# Patient Record
Sex: MALE | Race: WHITE | HISPANIC OR LATINO | URBAN - METROPOLITAN AREA
[De-identification: names, ages, dates, MRNs, and addresses within clinical notes are randomized per-mention and may not be internally consistent; named-entity substitution may affect disease eponyms.]

---

## 2024-04-30 ENCOUNTER — INPATIENT (INPATIENT)
Facility: HOSPITAL | Age: 59
LOS: 5 days | Discharge: HOME HEALTH | DRG: 639 | End: 2024-05-06
Admitting: STUDENT IN AN ORGANIZED HEALTH CARE EDUCATION/TRAINING PROGRAM
Payer: MEDICAID

## 2024-04-30 VITALS
HEIGHT: 74 IN | WEIGHT: 162.92 LBS | RESPIRATION RATE: 20 BRPM | TEMPERATURE: 98 F | SYSTOLIC BLOOD PRESSURE: 184 MMHG | DIASTOLIC BLOOD PRESSURE: 93 MMHG | HEART RATE: 92 BPM | OXYGEN SATURATION: 98 %

## 2024-04-30 DIAGNOSIS — E11.65 TYPE 2 DIABETES MELLITUS WITH HYPERGLYCEMIA: ICD-10-CM

## 2024-04-30 LAB
ALBUMIN SERPL ELPH-MCNC: 3.6 G/DL — SIGNIFICANT CHANGE UP (ref 3.3–5.2)
ALP SERPL-CCNC: 90 U/L — SIGNIFICANT CHANGE UP (ref 40–120)
ALT FLD-CCNC: 10 U/L — SIGNIFICANT CHANGE UP
ANION GAP SERPL CALC-SCNC: 15 MMOL/L — SIGNIFICANT CHANGE UP (ref 5–17)
AST SERPL-CCNC: 14 U/L — SIGNIFICANT CHANGE UP
BASOPHILS # BLD AUTO: 0.03 K/UL — SIGNIFICANT CHANGE UP (ref 0–0.2)
BASOPHILS NFR BLD AUTO: 0.7 % — SIGNIFICANT CHANGE UP (ref 0–2)
BILIRUB SERPL-MCNC: 0.3 MG/DL — LOW (ref 0.4–2)
BUN SERPL-MCNC: 23.7 MG/DL — HIGH (ref 8–20)
CALCIUM SERPL-MCNC: 9.3 MG/DL — SIGNIFICANT CHANGE UP (ref 8.4–10.5)
CHLORIDE SERPL-SCNC: 88 MMOL/L — LOW (ref 96–108)
CO2 SERPL-SCNC: 25 MMOL/L — SIGNIFICANT CHANGE UP (ref 22–29)
CREAT SERPL-MCNC: 0.87 MG/DL — SIGNIFICANT CHANGE UP (ref 0.5–1.3)
EGFR: 99 ML/MIN/1.73M2 — SIGNIFICANT CHANGE UP
EOSINOPHIL # BLD AUTO: 0.14 K/UL — SIGNIFICANT CHANGE UP (ref 0–0.5)
EOSINOPHIL NFR BLD AUTO: 3.1 % — SIGNIFICANT CHANGE UP (ref 0–6)
GLUCOSE BLDC GLUCOMTR-MCNC: 181 MG/DL — HIGH (ref 70–99)
GLUCOSE SERPL-MCNC: 554 MG/DL — CRITICAL HIGH (ref 70–99)
HCT VFR BLD CALC: 38.8 % — LOW (ref 39–50)
HGB BLD-MCNC: 13.4 G/DL — SIGNIFICANT CHANGE UP (ref 13–17)
IMM GRANULOCYTES NFR BLD AUTO: 0.2 % — SIGNIFICANT CHANGE UP (ref 0–0.9)
LYMPHOCYTES # BLD AUTO: 1.25 K/UL — SIGNIFICANT CHANGE UP (ref 1–3.3)
LYMPHOCYTES # BLD AUTO: 27.5 % — SIGNIFICANT CHANGE UP (ref 13–44)
MCHC RBC-ENTMCNC: 30.1 PG — SIGNIFICANT CHANGE UP (ref 27–34)
MCHC RBC-ENTMCNC: 34.5 GM/DL — SIGNIFICANT CHANGE UP (ref 32–36)
MCV RBC AUTO: 87.2 FL — SIGNIFICANT CHANGE UP (ref 80–100)
MONOCYTES # BLD AUTO: 0.68 K/UL — SIGNIFICANT CHANGE UP (ref 0–0.9)
MONOCYTES NFR BLD AUTO: 15 % — HIGH (ref 2–14)
NEUTROPHILS # BLD AUTO: 2.43 K/UL — SIGNIFICANT CHANGE UP (ref 1.8–7.4)
NEUTROPHILS NFR BLD AUTO: 53.5 % — SIGNIFICANT CHANGE UP (ref 43–77)
PLATELET # BLD AUTO: 299 K/UL — SIGNIFICANT CHANGE UP (ref 150–400)
POTASSIUM SERPL-MCNC: 4.8 MMOL/L — SIGNIFICANT CHANGE UP (ref 3.5–5.3)
POTASSIUM SERPL-SCNC: 4.8 MMOL/L — SIGNIFICANT CHANGE UP (ref 3.5–5.3)
PROT SERPL-MCNC: 7.3 G/DL — SIGNIFICANT CHANGE UP (ref 6.6–8.7)
RBC # BLD: 4.45 M/UL — SIGNIFICANT CHANGE UP (ref 4.2–5.8)
RBC # FLD: 11.3 % — SIGNIFICANT CHANGE UP (ref 10.3–14.5)
SODIUM SERPL-SCNC: 128 MMOL/L — LOW (ref 135–145)
WBC # BLD: 4.54 K/UL — SIGNIFICANT CHANGE UP (ref 3.8–10.5)
WBC # FLD AUTO: 4.54 K/UL — SIGNIFICANT CHANGE UP (ref 3.8–10.5)

## 2024-04-30 PROCEDURE — 99222 1ST HOSP IP/OBS MODERATE 55: CPT

## 2024-04-30 PROCEDURE — 99285 EMERGENCY DEPT VISIT HI MDM: CPT

## 2024-04-30 PROCEDURE — 73630 X-RAY EXAM OF FOOT: CPT | Mod: 26,LT

## 2024-04-30 RX ORDER — SODIUM CHLORIDE 9 MG/ML
1000 INJECTION, SOLUTION INTRAVENOUS
Refills: 0 | Status: DISCONTINUED | OUTPATIENT
Start: 2024-04-30 | End: 2024-05-06

## 2024-04-30 RX ORDER — LANOLIN ALCOHOL/MO/W.PET/CERES
3 CREAM (GRAM) TOPICAL AT BEDTIME
Refills: 0 | Status: DISCONTINUED | OUTPATIENT
Start: 2024-04-30 | End: 2024-05-06

## 2024-04-30 RX ORDER — HEPARIN SODIUM 5000 [USP'U]/ML
5000 INJECTION INTRAVENOUS; SUBCUTANEOUS EVERY 12 HOURS
Refills: 0 | Status: DISCONTINUED | OUTPATIENT
Start: 2024-04-30 | End: 2024-05-06

## 2024-04-30 RX ORDER — INSULIN GLARGINE 100 [IU]/ML
20 INJECTION, SOLUTION SUBCUTANEOUS AT BEDTIME
Refills: 0 | Status: DISCONTINUED | OUTPATIENT
Start: 2024-04-30 | End: 2024-05-05

## 2024-04-30 RX ORDER — LOSARTAN POTASSIUM 100 MG/1
100 TABLET, FILM COATED ORAL DAILY
Refills: 0 | Status: DISCONTINUED | OUTPATIENT
Start: 2024-04-30 | End: 2024-05-06

## 2024-04-30 RX ORDER — ACETAMINOPHEN 500 MG
650 TABLET ORAL EVERY 6 HOURS
Refills: 0 | Status: DISCONTINUED | OUTPATIENT
Start: 2024-04-30 | End: 2024-05-06

## 2024-04-30 RX ORDER — DEXTROSE 50 % IN WATER 50 %
15 SYRINGE (ML) INTRAVENOUS ONCE
Refills: 0 | Status: DISCONTINUED | OUTPATIENT
Start: 2024-04-30 | End: 2024-05-06

## 2024-04-30 RX ORDER — INSULIN LISPRO 100/ML
5 VIAL (ML) SUBCUTANEOUS
Refills: 0 | Status: DISCONTINUED | OUTPATIENT
Start: 2024-04-30 | End: 2024-05-05

## 2024-04-30 RX ORDER — PIPERACILLIN AND TAZOBACTAM 4; .5 G/20ML; G/20ML
3.38 INJECTION, POWDER, LYOPHILIZED, FOR SOLUTION INTRAVENOUS EVERY 8 HOURS
Refills: 0 | Status: DISCONTINUED | OUTPATIENT
Start: 2024-05-01 | End: 2024-05-06

## 2024-04-30 RX ORDER — INSULIN HUMAN 100 [IU]/ML
10 INJECTION, SOLUTION SUBCUTANEOUS ONCE
Refills: 0 | Status: COMPLETED | OUTPATIENT
Start: 2024-04-30 | End: 2024-04-30

## 2024-04-30 RX ORDER — PIPERACILLIN AND TAZOBACTAM 4; .5 G/20ML; G/20ML
3.38 INJECTION, POWDER, LYOPHILIZED, FOR SOLUTION INTRAVENOUS ONCE
Refills: 0 | Status: COMPLETED | OUTPATIENT
Start: 2024-05-01 | End: 2024-05-01

## 2024-04-30 RX ORDER — ONDANSETRON 8 MG/1
4 TABLET, FILM COATED ORAL EVERY 8 HOURS
Refills: 0 | Status: DISCONTINUED | OUTPATIENT
Start: 2024-04-30 | End: 2024-05-06

## 2024-04-30 RX ORDER — SODIUM CHLORIDE 9 MG/ML
3 INJECTION INTRAMUSCULAR; INTRAVENOUS; SUBCUTANEOUS EVERY 8 HOURS
Refills: 0 | Status: DISCONTINUED | OUTPATIENT
Start: 2024-04-30 | End: 2024-05-06

## 2024-04-30 RX ORDER — SODIUM CHLORIDE 9 MG/ML
1000 INJECTION INTRAMUSCULAR; INTRAVENOUS; SUBCUTANEOUS ONCE
Refills: 0 | Status: COMPLETED | OUTPATIENT
Start: 2024-04-30 | End: 2024-04-30

## 2024-04-30 RX ORDER — INSULIN LISPRO 100/ML
VIAL (ML) SUBCUTANEOUS
Refills: 0 | Status: DISCONTINUED | OUTPATIENT
Start: 2024-04-30 | End: 2024-05-06

## 2024-04-30 RX ORDER — DEXTROSE 10 % IN WATER 10 %
125 INTRAVENOUS SOLUTION INTRAVENOUS ONCE
Refills: 0 | Status: DISCONTINUED | OUTPATIENT
Start: 2024-04-30 | End: 2024-05-06

## 2024-04-30 RX ORDER — PIPERACILLIN AND TAZOBACTAM 4; .5 G/20ML; G/20ML
3.38 INJECTION, POWDER, LYOPHILIZED, FOR SOLUTION INTRAVENOUS ONCE
Refills: 0 | Status: COMPLETED | OUTPATIENT
Start: 2024-04-30 | End: 2024-04-30

## 2024-04-30 RX ORDER — GLUCAGON INJECTION, SOLUTION 0.5 MG/.1ML
1 INJECTION, SOLUTION SUBCUTANEOUS ONCE
Refills: 0 | Status: DISCONTINUED | OUTPATIENT
Start: 2024-04-30 | End: 2024-05-06

## 2024-04-30 RX ORDER — DEXTROSE 50 % IN WATER 50 %
25 SYRINGE (ML) INTRAVENOUS ONCE
Refills: 0 | Status: DISCONTINUED | OUTPATIENT
Start: 2024-04-30 | End: 2024-05-06

## 2024-04-30 RX ADMIN — Medication 100 MILLIGRAM(S): at 19:56

## 2024-04-30 RX ADMIN — SODIUM CHLORIDE 1000 MILLILITER(S): 9 INJECTION INTRAMUSCULAR; INTRAVENOUS; SUBCUTANEOUS at 21:05

## 2024-04-30 RX ADMIN — INSULIN GLARGINE 20 UNIT(S): 100 INJECTION, SOLUTION SUBCUTANEOUS at 23:16

## 2024-04-30 RX ADMIN — INSULIN HUMAN 10 UNIT(S): 100 INJECTION, SOLUTION SUBCUTANEOUS at 19:56

## 2024-04-30 RX ADMIN — Medication 2: at 23:15

## 2024-04-30 RX ADMIN — SODIUM CHLORIDE 3 MILLILITER(S): 9 INJECTION INTRAMUSCULAR; INTRAVENOUS; SUBCUTANEOUS at 23:00

## 2024-04-30 RX ADMIN — SODIUM CHLORIDE 1000 MILLILITER(S): 9 INJECTION INTRAMUSCULAR; INTRAVENOUS; SUBCUTANEOUS at 19:56

## 2024-04-30 NOTE — ED PROVIDER NOTE - OBJECTIVE STATEMENT
58 year old male with PMHx NIDDM, HTN presenting for evaluation of wound to dorsal aspect of L foot. Patient states on 4/23/24 he had just returned home from gardening, took off his sock, which pulled off a layer of his skin with it. States that in the morning when putting on his sock, there was no blister or injury to the foot. States that as he took off the sock, thinks there was a blister that was broken open and unroofed. States that the skin at that time was pink. Has been applying neosporin and crushed aspirin to it. Reports increased surrounding redness and blisters. Denies any pain, fever, chills, numbness, tingling. 58 year old male with PMHx NIDDM (metformin 1g BID), HTN presenting for evaluation of wound to dorsal aspect of L foot. Patient states on 4/23/24 he had just returned home from gardening, took off his sock, which pulled off a layer of his skin with it. States that in the morning when putting on his sock, there was no blister or injury to the foot. States that as he took off the sock, thinks there was a blister that was broken open and unroofed. States that the skin at that time was pink. Has been applying neosporin and crushed aspirin to it. Reports increased surrounding redness and blisters. Denies any pain, fever, chills, numbness, tingling.

## 2024-04-30 NOTE — ED ADULT NURSE NOTE - OBJECTIVE STATEMENT
patient came in for eval of left foot wound that started a week ago after removing his socks and the top skin of his left foot came with it. wound is non draining with dry yellow discharge surrounding it.

## 2024-04-30 NOTE — ED PROVIDER NOTE - CLINICAL SUMMARY MEDICAL DECISION MAKING FREE TEXT BOX
58 year old male presenting with L foot wound. 58 year old male presenting with L foot wound x 1 week, poorly healing. Labs reviewed, patient with uncontrolled DM, a1C 14.1. Patient treated with clindamycin, insulin, fluids. Plan for admission for abx, podiatry eval, diabetes management. 58 year old male presenting with L foot wound x 1 week, poorly healing. Labs reviewed, patient with uncontrolled DM, a1C 14.1. Patient treated with clindamycin, insulin, fluids. Consult placed to podiatry. Plan for admission for abx, podiatry eval, diabetes management.

## 2024-04-30 NOTE — ED PROVIDER NOTE - CARE PLAN
1 Principal Discharge DX:	Uncontrolled diabetes mellitus with hyperglycemia  Secondary Diagnosis:	Wound of foot

## 2024-04-30 NOTE — ED PROVIDER NOTE - ATTENDING CONTRIBUTION TO CARE
I, Lopez Plaza, performed a face to face bedside interview with this patient regarding history of present illness, and completed an independent physical examination. I personally made/approved the management plan and take responsibility for the patient management. I have communicated the patient’s plan of care and disposition with the resident.  58-year-old male with past medical history of non-insulin-dependent diabetes presents with foot ulcer and swelling.  Patient reports that 1 week ago he started develop a ulcer to the dorsum of his left foot.  He states that over the interim week he has had progressive redness, swelling, pain to the foot.  No numbness, tingling, fevers  Gen: NAD, well appearing  CV: RRR  Pul: CTA b/l  Abd: Soft, non-distended, non-tender  Neuro: no focal deficits  msk:  large superficial ulcer to the dorsum of the left foot, surrounding erythema, induration, tenderness and edema extending all the way to the ankle  Pt  admitted for infected diabetic foot ulcer and uncontrolled diabetes

## 2024-04-30 NOTE — ED PROVIDER NOTE - PRINCIPAL DIAGNOSIS
Patient daughter has been informed that we are working on getting Saturday hours . Patient daughter stated that they would prefer Saturday appts.     Patient daughter has been advised that we will keep her posted .  
Please let patient's daughter know that we are still working on getting me Saturday hours. If she can be seen during the week, then we should set something up. I have another meeting on 9/14 about the Saturday clinic, so it is unlikely that I can see her before then.    Thanks,  THOR  
Uncontrolled diabetes mellitus with hyperglycemia

## 2024-04-30 NOTE — ED ADULT TRIAGE NOTE - CHIEF COMPLAINT QUOTE
Pt arrives to ED c/o L foot wound - pt states a week ago he took his sock off and the skin came off + redness noted. Hx of DM type II

## 2024-04-30 NOTE — ED PROVIDER NOTE - PHYSICAL EXAMINATION
Gen: well appearing, no acute distress  Head: normocephalic, atraumatic  EENT: EOMI, moist mucous membranes  Lung: no increased work of breathing, clear to auscultation bilaterally, no wheezing, rales, rhonchi, speaking in full sentences  CV: regular rate, regular rhythm  Abd: soft, non-tender, non-distended  MSK: (+)b/l LE edema mostly concentrated at ankles. (+)large open wound to dorsal aspect of L foot with surrounding edema, erythema, bullae   Neuro: Awake, alert, no focal neurologic deficits  Psych: normal affect, normal speech

## 2024-04-30 NOTE — ED ADULT NURSE NOTE - PRO INTERPRETER NEED 2
----- Message from LAURIE Newell sent at 3/5/2021  3:34 PM CST -----  Note BMP - sodium remains low, new orders written at facility this morning.   Prydeinig

## 2024-05-01 LAB
ANION GAP SERPL CALC-SCNC: 10 MMOL/L — SIGNIFICANT CHANGE UP (ref 5–17)
BUN SERPL-MCNC: 25 MG/DL — HIGH (ref 8–20)
CALCIUM SERPL-MCNC: 9 MG/DL — SIGNIFICANT CHANGE UP (ref 8.4–10.5)
CHLORIDE SERPL-SCNC: 94 MMOL/L — LOW (ref 96–108)
CO2 SERPL-SCNC: 29 MMOL/L — SIGNIFICANT CHANGE UP (ref 22–29)
CREAT SERPL-MCNC: 0.69 MG/DL — SIGNIFICANT CHANGE UP (ref 0.5–1.3)
CRP SERPL-MCNC: 27 MG/L — HIGH
EGFR: 106 ML/MIN/1.73M2 — SIGNIFICANT CHANGE UP
ERYTHROCYTE [SEDIMENTATION RATE] IN BLOOD: 56 MM/HR — HIGH (ref 0–15)
GLUCOSE BLDC GLUCOMTR-MCNC: 152 MG/DL — HIGH (ref 70–99)
GLUCOSE BLDC GLUCOMTR-MCNC: 170 MG/DL — HIGH (ref 70–99)
GLUCOSE BLDC GLUCOMTR-MCNC: 203 MG/DL — HIGH (ref 70–99)
GLUCOSE BLDC GLUCOMTR-MCNC: 323 MG/DL — HIGH (ref 70–99)
GLUCOSE SERPL-MCNC: 208 MG/DL — HIGH (ref 70–99)
HCT VFR BLD CALC: 35.8 % — LOW (ref 39–50)
HGB BLD-MCNC: 12.2 G/DL — LOW (ref 13–17)
MAGNESIUM SERPL-MCNC: 2.2 MG/DL — SIGNIFICANT CHANGE UP (ref 1.6–2.6)
MCHC RBC-ENTMCNC: 30 PG — SIGNIFICANT CHANGE UP (ref 27–34)
MCHC RBC-ENTMCNC: 34.1 GM/DL — SIGNIFICANT CHANGE UP (ref 32–36)
MCV RBC AUTO: 88.2 FL — SIGNIFICANT CHANGE UP (ref 80–100)
PHOSPHATE SERPL-MCNC: 3.2 MG/DL — SIGNIFICANT CHANGE UP (ref 2.4–4.7)
PLATELET # BLD AUTO: 283 K/UL — SIGNIFICANT CHANGE UP (ref 150–400)
POTASSIUM SERPL-MCNC: 3.7 MMOL/L — SIGNIFICANT CHANGE UP (ref 3.5–5.3)
POTASSIUM SERPL-SCNC: 3.7 MMOL/L — SIGNIFICANT CHANGE UP (ref 3.5–5.3)
RBC # BLD: 4.06 M/UL — LOW (ref 4.2–5.8)
RBC # FLD: 11.3 % — SIGNIFICANT CHANGE UP (ref 10.3–14.5)
SODIUM SERPL-SCNC: 133 MMOL/L — LOW (ref 135–145)
WBC # BLD: 5.23 K/UL — SIGNIFICANT CHANGE UP (ref 3.8–10.5)
WBC # FLD AUTO: 5.23 K/UL — SIGNIFICANT CHANGE UP (ref 3.8–10.5)

## 2024-05-01 PROCEDURE — 99222 1ST HOSP IP/OBS MODERATE 55: CPT

## 2024-05-01 PROCEDURE — 99233 SBSQ HOSP IP/OBS HIGH 50: CPT

## 2024-05-01 RX ORDER — LOSARTAN POTASSIUM 100 MG/1
1 TABLET, FILM COATED ORAL
Refills: 0 | DISCHARGE

## 2024-05-01 RX ORDER — INFLUENZA VIRUS VACCINE 15; 15; 15; 15 UG/.5ML; UG/.5ML; UG/.5ML; UG/.5ML
0.5 SUSPENSION INTRAMUSCULAR ONCE
Refills: 0 | Status: DISCONTINUED | OUTPATIENT
Start: 2024-05-01 | End: 2024-05-06

## 2024-05-01 RX ADMIN — HEPARIN SODIUM 5000 UNIT(S): 5000 INJECTION INTRAVENOUS; SUBCUTANEOUS at 05:02

## 2024-05-01 RX ADMIN — SODIUM CHLORIDE 3 MILLILITER(S): 9 INJECTION INTRAMUSCULAR; INTRAVENOUS; SUBCUTANEOUS at 13:49

## 2024-05-01 RX ADMIN — Medication 2: at 18:05

## 2024-05-01 RX ADMIN — PIPERACILLIN AND TAZOBACTAM 200 GRAM(S): 4; .5 INJECTION, POWDER, LYOPHILIZED, FOR SOLUTION INTRAVENOUS at 00:13

## 2024-05-01 RX ADMIN — Medication 8: at 13:42

## 2024-05-01 RX ADMIN — Medication 5 UNIT(S): at 18:04

## 2024-05-01 RX ADMIN — INSULIN GLARGINE 20 UNIT(S): 100 INJECTION, SOLUTION SUBCUTANEOUS at 21:51

## 2024-05-01 RX ADMIN — SODIUM CHLORIDE 3 MILLILITER(S): 9 INJECTION INTRAMUSCULAR; INTRAVENOUS; SUBCUTANEOUS at 05:16

## 2024-05-01 RX ADMIN — PIPERACILLIN AND TAZOBACTAM 25 GRAM(S): 4; .5 INJECTION, POWDER, LYOPHILIZED, FOR SOLUTION INTRAVENOUS at 18:04

## 2024-05-01 RX ADMIN — HEPARIN SODIUM 5000 UNIT(S): 5000 INJECTION INTRAVENOUS; SUBCUTANEOUS at 18:06

## 2024-05-01 RX ADMIN — PIPERACILLIN AND TAZOBACTAM 25 GRAM(S): 4; .5 INJECTION, POWDER, LYOPHILIZED, FOR SOLUTION INTRAVENOUS at 09:30

## 2024-05-01 RX ADMIN — Medication 5 UNIT(S): at 13:41

## 2024-05-01 RX ADMIN — Medication 2: at 21:52

## 2024-05-01 RX ADMIN — PIPERACILLIN AND TAZOBACTAM 25 GRAM(S): 4; .5 INJECTION, POWDER, LYOPHILIZED, FOR SOLUTION INTRAVENOUS at 02:14

## 2024-05-01 RX ADMIN — SODIUM CHLORIDE 3 MILLILITER(S): 9 INJECTION INTRAMUSCULAR; INTRAVENOUS; SUBCUTANEOUS at 21:48

## 2024-05-01 RX ADMIN — Medication 5 UNIT(S): at 09:30

## 2024-05-01 RX ADMIN — LOSARTAN POTASSIUM 100 MILLIGRAM(S): 100 TABLET, FILM COATED ORAL at 05:02

## 2024-05-01 RX ADMIN — PIPERACILLIN AND TAZOBACTAM 25 GRAM(S): 4; .5 INJECTION, POWDER, LYOPHILIZED, FOR SOLUTION INTRAVENOUS at 21:51

## 2024-05-01 RX ADMIN — Medication 4: at 09:30

## 2024-05-01 NOTE — CONSULT NOTE ADULT - SUBJECTIVE AND OBJECTIVE BOX
Vascular Attending:        HPI:  57 y/o male with hx of HTN/DM-2 presents to ED c/o 1 week of left foot swelling, redness, and large wound to top of foot. He states  he was working outside last week and later in the evening when he took his shoe and sock off the skin on top of his foot sloughed off. Since that time he was putting Neosporin and he was rubbing aspirin in the wound. Today when he woke up his foot was swollen, red, and there was draining from the wound. His family advised him to come to the ED for further evaluation. Denies fever, chills, N/V, SOB, or CP. In the ED vitals stable, left foot with large 8x6cm area of denuded skin with fibrinous exudates purulence with surrounding erythema, edema, and warmth. No crepitus, no fluctuance noted. Xray with no soft tissue air. Labs with glucose of 554, A1c of 14. normal AG/HCO3, Cultures sent, started on Clindamycin. Denies any other complaints at this time.   (01 May 2024 00:07)      PAST MEDICAL & SURGICAL HISTORY:  HTN (hypertension)      Diabetes      No significant past surgical history          REVIEW OF SYSTEMS      General:	    Skin/Breast:  	  Ophthalmologic:  	  ENMT:	    Respiratory and Thorax:  	  Cardiovascular:	    Gastrointestinal:	    Genitourinary:	    Musculoskeletal:	    Neurological:	    Psychiatric:	    Hematology/Lymphatics:	    Endocrine:	    Allergic/Immunologic:	    MEDICATIONS  (STANDING):  dextrose 10% Bolus 125 milliLiter(s) IV Bolus once  dextrose 5%. 1000 milliLiter(s) (50 mL/Hr) IV Continuous <Continuous>  dextrose 50% Injectable 25 Gram(s) IV Push once  glucagon  Injectable 1 milliGRAM(s) IntraMuscular once  heparin   Injectable 5000 Unit(s) SubCutaneous every 12 hours  influenza   Vaccine 0.5 milliLiter(s) IntraMuscular once  insulin glargine Injectable (LANTUS) 20 Unit(s) SubCutaneous at bedtime  insulin lispro (ADMELOG) corrective regimen sliding scale   SubCutaneous Before meals and at bedtime  insulin lispro Injectable (ADMELOG) 5 Unit(s) SubCutaneous before lunch  insulin lispro Injectable (ADMELOG) 5 Unit(s) SubCutaneous before dinner  insulin lispro Injectable (ADMELOG) 5 Unit(s) SubCutaneous before breakfast  losartan 100 milliGRAM(s) Oral daily  piperacillin/tazobactam IVPB.- 3.375 Gram(s) IV Intermittent once  piperacillin/tazobactam IVPB.. 3.375 Gram(s) IV Intermittent every 8 hours  sodium chloride 0.9% lock flush 3 milliLiter(s) IV Push every 8 hours    MEDICATIONS  (PRN):  acetaminophen     Tablet .. 650 milliGRAM(s) Oral every 6 hours PRN Temp greater or equal to 38C (100.4F), Mild Pain (1 - 3)  aluminum hydroxide/magnesium hydroxide/simethicone Suspension 30 milliLiter(s) Oral every 4 hours PRN Dyspepsia  dextrose Oral Gel 15 Gram(s) Oral once PRN Blood Glucose LESS THAN 70 milliGRAM(s)/deciliter  melatonin 3 milliGRAM(s) Oral at bedtime PRN Insomnia  ondansetron Injectable 4 milliGRAM(s) IV Push every 8 hours PRN Nausea and/or Vomiting      Allergies    No Known Allergies    Intolerances        SOCIAL HISTORY:      Vital Signs Last 24 Hrs  T(C): 36.7 (01 May 2024 10:47), Max: 36.9 (01 May 2024 04:40)  T(F): 98.1 (01 May 2024 10:47), Max: 98.4 (01 May 2024 04:40)  HR: 80 (01 May 2024 10:47) (75 - 92)  BP: 117/70 (01 May 2024 10:47) (117/70 - 184/93)  BP(mean): 104 (01 May 2024 00:07) (104 - 104)  RR: 18 (01 May 2024 04:40) (18 - 20)  SpO2: 96% (01 May 2024 10:47) (96% - 98%)    Parameters below as of 01 May 2024 04:40  Patient On (Oxygen Delivery Method): room air        PHYSICAL EXAM:      Constitutional:    Eyes:    ENMT:    Neck:    Breasts:    Back:    Respiratory:    Cardiovascular:    Gastrointestinal:    Genitourinary:    Rectal:    Extremities:    Vascular:    Neurological:    Skin:    Lymph Nodes:    Musculoskeletal:    Psychiatric:      Pulses:   Right:                                                                          Left:  FEM [ ]2+ [ ]1+ [ ]doppler                                             FEM [ ]2+ [ ]1+ [ ]doppler    POP [ ]2+ [ ]1+ [ ]doppler                                             POP [ ]2+ [ ]1+ [ ]doppler    DP [ ]2+ [ ]1+ [ ]doppler                                                DP [ ]2+ [ ]1+ [ ]doppler  PT[ ]2+ [ ]1+ [ ]doppler                                                  PT [ ]2+ [ ]1+ [ ]doppler      LABS:                        12.2   5.23  )-----------( 283      ( 01 May 2024 03:41 )             35.8     05-01    133<L>  |  94<L>  |  25.0<H>  ----------------------------<  208<H>  3.7   |  29.0  |  0.69    Ca    9.0      01 May 2024 03:41  Phos  3.2     05-01  Mg     2.2     05-01    TPro  7.3  /  Alb  3.6  /  TBili  0.3<L>  /  DBili  x   /  AST  14  /  ALT  10  /  AlkPhos  90  04-30      Urinalysis Basic - ( 01 May 2024 03:41 )    Color: x / Appearance: x / SG: x / pH: x  Gluc: 208 mg/dL / Ketone: x  / Bili: x / Urobili: x   Blood: x / Protein: x / Nitrite: x   Leuk Esterase: x / RBC: x / WBC x   Sq Epi: x / Non Sq Epi: x / Bacteria: x        RADIOLOGY & ADDITIONAL STUDIES    Impression and Plan: Vascular Attending:  Silvia VAN    Vascular Surgery HPI  Admission HPI reviewed below  59 yo hist of DM, presenting with foot wear trauma, non healing left foot wound  left dorsum wound with associated edema and erythema  vascular surgical input requested  no history of claudication, rest pain, or vascular interventions       HPI:  59 y/o male with hx of HTN/DM-2 presents to ED c/o 1 week of left foot swelling, redness, and large wound to top of foot. He states  he was working outside last week and later in the evening when he took his shoe and sock off the skin on top of his foot sloughed off. Since that time he was putting Neosporin and he was rubbing aspirin in the wound. Today when he woke up his foot was swollen, red, and there was draining from the wound. His family advised him to come to the ED for further evaluation. Denies fever, chills, N/V, SOB, or CP. In the ED vitals stable, left foot with large 8x6cm area of denuded skin with fibrinous exudates purulence with surrounding erythema, edema, and warmth. No crepitus, no fluctuance noted. Xray with no soft tissue air. Labs with glucose of 554, A1c of 14. normal AG/HCO3, Cultures sent, started on Clindamycin. Denies any other complaints at this time.   (01 May 2024 00:07)      PAST MEDICAL & SURGICAL HISTORY:  HTN (hypertension)      Diabetes      No significant past surgical history          REVIEW OF SYSTEMS;  see HPI      General:	    Skin/Breast:  	  Ophthalmologic:  	  ENMT:	    Respiratory and Thorax:  	  Cardiovascular:	    Gastrointestinal:	    Genitourinary:	    Musculoskeletal:	    Neurological:	    Psychiatric:	    Hematology/Lymphatics:	    Endocrine:	    Allergic/Immunologic:	    MEDICATIONS  (STANDING):  dextrose 10% Bolus 125 milliLiter(s) IV Bolus once  dextrose 5%. 1000 milliLiter(s) (50 mL/Hr) IV Continuous <Continuous>  dextrose 50% Injectable 25 Gram(s) IV Push once  glucagon  Injectable 1 milliGRAM(s) IntraMuscular once  heparin   Injectable 5000 Unit(s) SubCutaneous every 12 hours  influenza   Vaccine 0.5 milliLiter(s) IntraMuscular once  insulin glargine Injectable (LANTUS) 20 Unit(s) SubCutaneous at bedtime  insulin lispro (ADMELOG) corrective regimen sliding scale   SubCutaneous Before meals and at bedtime  insulin lispro Injectable (ADMELOG) 5 Unit(s) SubCutaneous before lunch  insulin lispro Injectable (ADMELOG) 5 Unit(s) SubCutaneous before dinner  insulin lispro Injectable (ADMELOG) 5 Unit(s) SubCutaneous before breakfast  losartan 100 milliGRAM(s) Oral daily  piperacillin/tazobactam IVPB.- 3.375 Gram(s) IV Intermittent once  piperacillin/tazobactam IVPB.. 3.375 Gram(s) IV Intermittent every 8 hours  sodium chloride 0.9% lock flush 3 milliLiter(s) IV Push every 8 hours    MEDICATIONS  (PRN):  acetaminophen     Tablet .. 650 milliGRAM(s) Oral every 6 hours PRN Temp greater or equal to 38C (100.4F), Mild Pain (1 - 3)  aluminum hydroxide/magnesium hydroxide/simethicone Suspension 30 milliLiter(s) Oral every 4 hours PRN Dyspepsia  dextrose Oral Gel 15 Gram(s) Oral once PRN Blood Glucose LESS THAN 70 milliGRAM(s)/deciliter  melatonin 3 milliGRAM(s) Oral at bedtime PRN Insomnia  ondansetron Injectable 4 milliGRAM(s) IV Push every 8 hours PRN Nausea and/or Vomiting      Allergies    No Known Allergies    Intolerances        SOCIAL HISTORY: non contributory       Vital Signs Last 24 Hrs  T(C): 36.7 (01 May 2024 10:47), Max: 36.9 (01 May 2024 04:40)  T(F): 98.1 (01 May 2024 10:47), Max: 98.4 (01 May 2024 04:40)  HR: 80 (01 May 2024 10:47) (75 - 92)  BP: 117/70 (01 May 2024 10:47) (117/70 - 184/93)  BP(mean): 104 (01 May 2024 00:07) (104 - 104)  RR: 18 (01 May 2024 04:40) (18 - 20)  SpO2: 96% (01 May 2024 10:47) (96% - 98%)    Parameters below as of 01 May 2024 04:40  Patient On (Oxygen Delivery Method): room air        PHYSICAL EXAM:      Constitutional:  no distress, alert and oriented     Eyes: no scleral icterus     ENMT: atraumatic     Neck: supple, no access catheters       Respiratory: non labored    Cardiovascular: rrr via peripheral palpation     Gastrointestinal: soft, nontender    Genitourinary:  not examined     Rectal: not examined     Extremities: significant edema, left foot with moderate size dorsal wound, fibrinous  with surrounding erythema     Vascular: non palpable left pedals, secondary to edema    Neurological: reduced motor fxn to the left foot due to edema    Skin: warm         Psychiatric: good affect       Pulses:   Right:                                                                          Left:  FEM [ ]2+ [ ]1+ [ ]doppler                                             FEM [ ]2+ [ ]1+ [ ]doppler    POP [ ]2+ [ ]1+ [ ]doppler                                             POP [ x2+ [ ]1+ [ ]doppler    DP [ x]2+ [ ]1+ [ ]doppler                                                DP [ ]2+ [ ]1+ [ x]doppler  P[x ]2+ [ ]1+ [ ]doppler                                                  PT [ ]2+ [ ]1+ [x ]doppler      LABS:                        12.2   5.23  )-----------( 283      ( 01 May 2024 03:41 )             35.8     05-01    133<L>  |  94<L>  |  25.0<H>  ----------------------------<  208<H>  3.7   |  29.0  |  0.69    Ca    9.0      01 May 2024 03:41  Phos  3.2     05-01  Mg     2.2     05-01    TPro  7.3  /  Alb  3.6  /  TBili  0.3<L>  /  DBili  x   /  AST  14  /  ALT  10  /  AlkPhos  90  04-30      Urinalysis Basic - ( 01 May 2024 03:41 )    Color: x / Appearance: x / SG: x / pH: x  Gluc: 208 mg/dL / Ketone: x  / Bili: x / Urobili: x   Blood: x / Protein: x / Nitrite: x   Leuk Esterase: x / RBC: x / WBC x   Sq Epi: x / Non Sq Epi: x / Bacteria: x        RADIOLOGY & ADDITIONAL STUDIES      Impression and Plan:    left foot traumatic non healing wound with associated  Vascular Attending:  Silvia VAN    Vascular Surgery HPI  Admission HPI reviewed below  59 yo hist of DM, presenting with foot wear trauma, non healing left foot wound  left dorsum wound with associated edema and erythema  vascular surgical input requested  no history of claudication, rest pain, or vascular interventions       HPI:  57 y/o male with hx of HTN/DM-2 presents to ED c/o 1 week of left foot swelling, redness, and large wound to top of foot. He states  he was working outside last week and later in the evening when he took his shoe and sock off the skin on top of his foot sloughed off. Since that time he was putting Neosporin and he was rubbing aspirin in the wound. Today when he woke up his foot was swollen, red, and there was draining from the wound. His family advised him to come to the ED for further evaluation. Denies fever, chills, N/V, SOB, or CP. In the ED vitals stable, left foot with large 8x6cm area of denuded skin with fibrinous exudates purulence with surrounding erythema, edema, and warmth. No crepitus, no fluctuance noted. Xray with no soft tissue air. Labs with glucose of 554, A1c of 14. normal AG/HCO3, Cultures sent, started on Clindamycin. Denies any other complaints at this time.   (01 May 2024 00:07)      PAST MEDICAL & SURGICAL HISTORY:  HTN (hypertension)      Diabetes      No significant past surgical history          REVIEW OF SYSTEMS;  see HPI      General:	    Skin/Breast:  	  Ophthalmologic:  	  ENMT:	    Respiratory and Thorax:  	  Cardiovascular:	    Gastrointestinal:	    Genitourinary:	    Musculoskeletal:	    Neurological:	    Psychiatric:	    Hematology/Lymphatics:	    Endocrine:	    Allergic/Immunologic:	    MEDICATIONS  (STANDING):  dextrose 10% Bolus 125 milliLiter(s) IV Bolus once  dextrose 5%. 1000 milliLiter(s) (50 mL/Hr) IV Continuous <Continuous>  dextrose 50% Injectable 25 Gram(s) IV Push once  glucagon  Injectable 1 milliGRAM(s) IntraMuscular once  heparin   Injectable 5000 Unit(s) SubCutaneous every 12 hours  influenza   Vaccine 0.5 milliLiter(s) IntraMuscular once  insulin glargine Injectable (LANTUS) 20 Unit(s) SubCutaneous at bedtime  insulin lispro (ADMELOG) corrective regimen sliding scale   SubCutaneous Before meals and at bedtime  insulin lispro Injectable (ADMELOG) 5 Unit(s) SubCutaneous before lunch  insulin lispro Injectable (ADMELOG) 5 Unit(s) SubCutaneous before dinner  insulin lispro Injectable (ADMELOG) 5 Unit(s) SubCutaneous before breakfast  losartan 100 milliGRAM(s) Oral daily  piperacillin/tazobactam IVPB.- 3.375 Gram(s) IV Intermittent once  piperacillin/tazobactam IVPB.. 3.375 Gram(s) IV Intermittent every 8 hours  sodium chloride 0.9% lock flush 3 milliLiter(s) IV Push every 8 hours    MEDICATIONS  (PRN):  acetaminophen     Tablet .. 650 milliGRAM(s) Oral every 6 hours PRN Temp greater or equal to 38C (100.4F), Mild Pain (1 - 3)  aluminum hydroxide/magnesium hydroxide/simethicone Suspension 30 milliLiter(s) Oral every 4 hours PRN Dyspepsia  dextrose Oral Gel 15 Gram(s) Oral once PRN Blood Glucose LESS THAN 70 milliGRAM(s)/deciliter  melatonin 3 milliGRAM(s) Oral at bedtime PRN Insomnia  ondansetron Injectable 4 milliGRAM(s) IV Push every 8 hours PRN Nausea and/or Vomiting      Allergies    No Known Allergies    Intolerances        SOCIAL HISTORY: non contributory       Vital Signs Last 24 Hrs  T(C): 36.7 (01 May 2024 10:47), Max: 36.9 (01 May 2024 04:40)  T(F): 98.1 (01 May 2024 10:47), Max: 98.4 (01 May 2024 04:40)  HR: 80 (01 May 2024 10:47) (75 - 92)  BP: 117/70 (01 May 2024 10:47) (117/70 - 184/93)  BP(mean): 104 (01 May 2024 00:07) (104 - 104)  RR: 18 (01 May 2024 04:40) (18 - 20)  SpO2: 96% (01 May 2024 10:47) (96% - 98%)    Parameters below as of 01 May 2024 04:40  Patient On (Oxygen Delivery Method): room air        PHYSICAL EXAM:      Constitutional:  no distress, alert and oriented     Eyes: no scleral icterus     ENMT: atraumatic     Neck: supple, no access catheters       Respiratory: non labored    Cardiovascular: rrr via peripheral palpation     Gastrointestinal: soft, nontender    Genitourinary:  not examined     Rectal: not examined     Extremities: significant edema, left foot with moderate size dorsal wound, fibrinous  with surrounding erythema     Vascular: non palpable left pedals, secondary to edema    Neurological: reduced motor fxn to the left foot due to edema    Skin: warm         Psychiatric: good affect       Pulses:   Right:                                                                          Left:  FEM [ ]2+ [ ]1+ [ ]doppler                                             FEM [ ]2+ [ ]1+ [ ]doppler    POP [ ]2+ [ ]1+ [ ]doppler                                             POP [ x2+ [ ]1+ [ ]doppler    DP [ x]2+ [ ]1+ [ ]doppler                                                DP [ ]2+ [ ]1+ [ x]doppler  P[x ]2+ [ ]1+ [ ]doppler                                                  PT [ ]2+ [ ]1+ [x ]doppler      LABS:                        12.2   5.23  )-----------( 283      ( 01 May 2024 03:41 )             35.8     05-01    133<L>  |  94<L>  |  25.0<H>  ----------------------------<  208<H>  3.7   |  29.0  |  0.69    Ca    9.0      01 May 2024 03:41  Phos  3.2     05-01  Mg     2.2     05-01    TPro  7.3  /  Alb  3.6  /  TBili  0.3<L>  /  DBili  x   /  AST  14  /  ALT  10  /  AlkPhos  90  04-30      Urinalysis Basic - ( 01 May 2024 03:41 )    Color: x / Appearance: x / SG: x / pH: x  Gluc: 208 mg/dL / Ketone: x  / Bili: x / Urobili: x   Blood: x / Protein: x / Nitrite: x   Leuk Esterase: x / RBC: x / WBC x   Sq Epi: x / Non Sq Epi: x / Bacteria: x        RADIOLOGY & ADDITIONAL STUDIES      Impression and Plan:    left foot traumatic non healing wound with associated cellulitis  DM    -- will benefit from an investigative angiogram at some point during this admission  - no current contraindications towards podiatry proceeding with any planned intervention

## 2024-05-01 NOTE — H&P ADULT - HISTORY OF PRESENT ILLNESS
57 y/o male with hx of HTN/DM-2 presents to ED c/o 1 week of left foot swelling, redness, and large wound to top of foot. He states  he was working outside last week and later in the evening when he took his shoe and sock off the skin on top of his foot sloughed off. Since that time he was putting Neosporin and he was rubbing aspirin in the wound. Today when he woke up his foot was swollen, red, and there was draining from the wound. His family advised him to come to the ED for further evaluation. Denies fever, chills, N/V, SOB, or CP. In the ED vitals stable, left foot with large 8x6cm area of denuded skin with fibrinous exudates purulence with surrounding erythema, edema, and warmth. No crepitus, no fluctuance noted. Xray with no soft tissue air. Labs with glucose of 554, A1c of 14. normal AG/HCO3, Cultures sent, started on Clindamycin. Denies any other complaints at this time.

## 2024-05-01 NOTE — PROGRESS NOTE ADULT - SUBJECTIVE AND OBJECTIVE BOX
Hospitalist Progress Note    Chief Complaint:  L Foot Cellulitis    SUBJECTIVE / OVERNIGHT EVENTS:  No events overnight, patient seen at bedside, in NAD, no complaints today. Patient denies chest pain, SOB, abd pain, N/V, fever, chills, dysuria or any other complaints. All remainder ROS negative.     MEDICATIONS  (STANDING):  dextrose 10% Bolus 125 milliLiter(s) IV Bolus once  dextrose 5%. 1000 milliLiter(s) (50 mL/Hr) IV Continuous <Continuous>  dextrose 50% Injectable 25 Gram(s) IV Push once  glucagon  Injectable 1 milliGRAM(s) IntraMuscular once  heparin   Injectable 5000 Unit(s) SubCutaneous every 12 hours  influenza   Vaccine 0.5 milliLiter(s) IntraMuscular once  insulin glargine Injectable (LANTUS) 20 Unit(s) SubCutaneous at bedtime  insulin lispro (ADMELOG) corrective regimen sliding scale   SubCutaneous Before meals and at bedtime  insulin lispro Injectable (ADMELOG) 5 Unit(s) SubCutaneous before breakfast  insulin lispro Injectable (ADMELOG) 5 Unit(s) SubCutaneous before lunch  insulin lispro Injectable (ADMELOG) 5 Unit(s) SubCutaneous before dinner  losartan 100 milliGRAM(s) Oral daily  piperacillin/tazobactam IVPB.- 3.375 Gram(s) IV Intermittent once  piperacillin/tazobactam IVPB.. 3.375 Gram(s) IV Intermittent every 8 hours  sodium chloride 0.9% lock flush 3 milliLiter(s) IV Push every 8 hours    MEDICATIONS  (PRN):  acetaminophen     Tablet .. 650 milliGRAM(s) Oral every 6 hours PRN Temp greater or equal to 38C (100.4F), Mild Pain (1 - 3)  aluminum hydroxide/magnesium hydroxide/simethicone Suspension 30 milliLiter(s) Oral every 4 hours PRN Dyspepsia  dextrose Oral Gel 15 Gram(s) Oral once PRN Blood Glucose LESS THAN 70 milliGRAM(s)/deciliter  melatonin 3 milliGRAM(s) Oral at bedtime PRN Insomnia  ondansetron Injectable 4 milliGRAM(s) IV Push every 8 hours PRN Nausea and/or Vomiting        I&O's Summary      PHYSICAL EXAM:  Vital Signs Last 24 Hrs  T(C): 36.9 (01 May 2024 04:40), Max: 36.9 (01 May 2024 04:40)  T(F): 98.4 (01 May 2024 04:40), Max: 98.4 (01 May 2024 04:40)  HR: 75 (01 May 2024 04:40) (75 - 92)  BP: 124/66 (01 May 2024 04:40) (124/66 - 184/93)  BP(mean): 104 (01 May 2024 00:07) (104 - 104)  RR: 18 (01 May 2024 04:40) (18 - 20)  SpO2: 97% (01 May 2024 04:40) (97% - 98%)    Parameters below as of 01 May 2024 04:40  Patient On (Oxygen Delivery Method): room air            CONSTITUTIONAL: NAD,  well-groomed  HEENMT: NC/AT, PERRLA, EOMI, Moist oral mucosa, no pharyngeal injection or exudates; normal dentition  RESPIRATORY: BLAE, No adventitious Lungs Sounds  CARDIOVASCULAR: S1/S2+, RRR, no MRG, No LE Edema  ABDOMEN: Soft, NT/ND, BS+, No guarding  MSK:  Moves limbs with purpose and direction; no clubbing or cyanosis of digits; no joint swelling or tenderness to palpation  PSYCH: normal mood and affect  NEUROLOGY: AAOx4, CN 2-12 are intact and symmetric; no gross sensory deficits;   SKIN: Warm, Dry, LLE Wound dorsum of left foot with large area of denuded skin, exudates/erythema, edema/warmth over    LABS:                        12.2   5.23  )-----------( 283      ( 01 May 2024 03:41 )             35.8     05-01    133<L>  |  94<L>  |  25.0<H>  ----------------------------<  208<H>  3.7   |  29.0  |  0.69    Ca    9.0      01 May 2024 03:41  Phos  3.2     05-01  Mg     2.2     05-01    TPro  7.3  /  Alb  3.6  /  TBili  0.3<L>  /  DBili  x   /  AST  14  /  ALT  10  /  AlkPhos  90  04-30          Urinalysis Basic - ( 01 May 2024 03:41 )    Color: x / Appearance: x / SG: x / pH: x  Gluc: 208 mg/dL / Ketone: x  / Bili: x / Urobili: x   Blood: x / Protein: x / Nitrite: x   Leuk Esterase: x / RBC: x / WBC x   Sq Epi: x / Non Sq Epi: x / Bacteria: x        CAPILLARY BLOOD GLUCOSE      POCT Blood Glucose.: 203 mg/dL (01 May 2024 08:42)  POCT Blood Glucose.: 181 mg/dL (30 Apr 2024 23:02)  POCT Blood Glucose.: 221 mg/dL (30 Apr 2024 21:05)        RADIOLOGY & ADDITIONAL TESTS:  Results Reviewed: Y  Imaging Personally Reviewed: N  Electrocardiogram Personally Reviewed: SUDARSHAN

## 2024-05-01 NOTE — CONSULT NOTE ADULT - ASSESSMENT
A:  Left foot dorsal foot wound      P:  Pt evaluated and chart reviewed  Vitals reviewed, no leukocytosis  Reviewed L foor x-rays- results above   Debrided L foot dorsal wound measurement above) with sterile #15 blade down to and including the subcutaneous level. Left fibrous cap intact. Pt tolerated procedure well.   Applied Santyl, DSD to left foot, patient to keep dressing D/C/I  Patient to be wb as tolerated to b/l LE  Pending JUAN RAMON/PVR- Appreciate vascular consult- pending plan  Podiatry to follow for local wound care and possible surgical intervention including skin graft.   C/w with IV abx per ID/IM  Discussed importance of daily foot examinations and proper shoe gear and to importance of lower Fasting Blood Glucose levels. Strict glycemic control for optimal wound healing.  Podiatry to follow in house  Discussed with Dr. Vega   A:  DFU left foot  Left foot cellulitis  PVD      P:  Pt evaluated and chart reviewed  Vitals reviewed, no leukocytosis  Reviewed L foor x-rays- results above   Debrided L foot dorsal wound measurement above) with sterile #15 blade down to and including the subcutaneous level. Left fibrous cap intact. Pt tolerated procedure well.   Applied Santyl, DSD to left foot, patient to keep dressing D/C/I  Patient to be wb as tolerated to b/l LE  Pending JUAN RAMON/PVR- Appreciate vascular consult- pending plan  Recommend LWC for now  C/w with IV abx per ID/IM  Discussed importance of daily foot examinations and proper shoe gear and to importance of lower Fasting Blood Glucose levels. Strict glycemic control for optimal wound healing.  Podiatry to follow in house  Discussed with Dr. Vega   A:  DFU left foot  Left foot cellulitis  PVD      P:  Pt evaluated and chart reviewed  Vitals reviewed, no leukocytosis  Reviewed L foor x-rays- results above   Selective debridement L foot dorsal wound measurement above) with sterile #15 blade down to and including slough and exudate. Left fibrous cap intact. Pt tolerated procedure well.   Applied Santyl, DSD to left foot, patient to keep dressing D/C/I  Patient to be wb as tolerated to b/l LE  Pending JUAN RAMON/PVR- Appreciate vascular consult- pending plan  Recommend LWC for now  C/w with IV abx per ID/IM  Discussed importance of daily foot examinations and proper shoe gear and to importance of lower Fasting Blood Glucose levels. Strict glycemic control for optimal wound healing.  Podiatry to follow in house  Discussed with Dr. Vega

## 2024-05-01 NOTE — H&P ADULT - RESPIRATORY AND THORAX
negative Oxybutynin Counseling:  I discussed with the patient the risks of oxybutynin including but not limited to skin rash, drowsiness, dry mouth, difficulty urinating, and blurred vision.

## 2024-05-01 NOTE — CONSULT NOTE ADULT - SUBJECTIVE AND OBJECTIVE BOX
59 y/o male with hx of HTN/DM-2 presents to ED c/o 1 week of left foot swelling, redness, and large wound to top of foot. He states  he was working outside last week and later in the evening when he took his shoe and sock off the skin on top of his foot sloughed off. Since that time he was putting Neosporin and he was rubbing aspirin in the wound. Today when he woke up his foot was swollen, red, and there was draining from the wound. His family advised him to come to the ED for further evaluation. Denies fever, chills, N/V, SOB, or CP. In the ED vitals stable, left foot with large 8x6cm area of denuded skin with fibrinous exudates purulence with surrounding erythema, edema, and warmth. No crepitus, no fluctuance noted. Xray with no soft tissue air. Labs with glucose of 554, A1c of 14. normal AG/HCO3, Cultures sent, started on Clindamycin. Denies any other complaints at this time.          Medications acetaminophen     Tablet .. 650 milliGRAM(s) Oral every 6 hours PRN  aluminum hydroxide/magnesium hydroxide/simethicone Suspension 30 milliLiter(s) Oral every 4 hours PRN  dextrose 10% Bolus 125 milliLiter(s) IV Bolus once  dextrose 5%. 1000 milliLiter(s) IV Continuous <Continuous>  dextrose 50% Injectable 25 Gram(s) IV Push once  dextrose Oral Gel 15 Gram(s) Oral once PRN  glucagon  Injectable 1 milliGRAM(s) IntraMuscular once  heparin   Injectable 5000 Unit(s) SubCutaneous every 12 hours  influenza   Vaccine 0.5 milliLiter(s) IntraMuscular once  insulin glargine Injectable (LANTUS) 20 Unit(s) SubCutaneous at bedtime  insulin lispro (ADMELOG) corrective regimen sliding scale   SubCutaneous Before meals and at bedtime  insulin lispro Injectable (ADMELOG) 5 Unit(s) SubCutaneous before breakfast  insulin lispro Injectable (ADMELOG) 5 Unit(s) SubCutaneous before lunch  insulin lispro Injectable (ADMELOG) 5 Unit(s) SubCutaneous before dinner  losartan 100 milliGRAM(s) Oral daily  melatonin 3 milliGRAM(s) Oral at bedtime PRN  ondansetron Injectable 4 milliGRAM(s) IV Push every 8 hours PRN  piperacillin/tazobactam IVPB.- 3.375 Gram(s) IV Intermittent once  piperacillin/tazobactam IVPB.. 3.375 Gram(s) IV Intermittent every 8 hours  sodium chloride 0.9% lock flush 3 milliLiter(s) IV Push every 8 hours    FHFH: diabetes mellitus    ,   PMHHTN (hypertension)    Diabetes       PSHNo significant past surgical history        Labs                          12.2   5.23  )-----------( 283      ( 01 May 2024 03:41 )             35.8      05-01    133<L>  |  94<L>  |  25.0<H>  ----------------------------<  208<H>  3.7   |  29.0  |  0.69    Ca    9.0      01 May 2024 03:41  Phos  3.2     05-01  Mg     2.2     05-01    TPro  7.3  /  Alb  3.6  /  TBili  0.3<L>  /  DBili  x   /  AST  14  /  ALT  10  /  AlkPhos  90  04-30     Vital Signs Last 24 Hrs  T(C): 36.7 (01 May 2024 10:47), Max: 36.9 (01 May 2024 04:40)  T(F): 98.1 (01 May 2024 10:47), Max: 98.4 (01 May 2024 04:40)  HR: 80 (01 May 2024 10:47) (75 - 92)  BP: 117/70 (01 May 2024 10:47) (117/70 - 184/93)  BP(mean): 104 (01 May 2024 00:07) (104 - 104)  RR: 18 (01 May 2024 04:40) (18 - 20)  SpO2: 96% (01 May 2024 10:47) (96% - 98%)    Parameters below as of 01 May 2024 04:40  Patient On (Oxygen Delivery Method): room air              WBC Count: 5.23 K/uL (05-01-24 @ 03:41)  WBC Count: 4.54 K/uL (04-30-24 @ 18:34)      ROS: Unremarkable outside HPI    LE Focused Exam  Vascular: Pulses non-palpable b/l. CFT ~6 secs x 10. Temp Gradient warm to warm L. Pedal hair absent. + non-pitting edema L foot,  Dermatological: L dorsal foot wound measuring 9x5.5x0.1cm with a fibrotic cap and granular boarder with surrounding erythema. No drainage, neg PTB, -soft tissue crepitus - fluctuance,  -malodor  Neurological: Patient is awake, alert and oriented x3. Gross Epicritic sensation is absent via ipswitch test 0/4 b/l. Vibratory sensation is diminished at the 1st MTPJ  MSK: Muscle strength 5/5 was notes in all compartments. NO pain on palpation. Arch height 2/5 on-WB, joint ROM wnl with no crepitation. Negative jaspreet sign b/l      < from: Xray Foot AP + Lateral + Oblique, Left (04.30.24 @ 19:39) >  IMPRESSION:  1.  No fracture.  2.  Mild generalized soft tissue swelling without subcutaneous emphysema   or plain film evidence of osteomyelitis

## 2024-05-01 NOTE — PROGRESS NOTE ADULT - ASSESSMENT
57 y/o male with left diabetic foot infection, Uncontrolled DM-2, HTN    #Left diabetic foot infection:  -No sepsis criteria  -Exam as above  -No soft tissue air on xray   -No evidence of NF  -No hx of MRSA  -Check MRSA pcr  -Change to Zosyn for polymicrobial coverage with hx of poorly controlled DM  -Trend WBC/Temp  -local wound care  -JUAN RAMON  -Podiatry f/u to eval for debridement  -DVT-P with ambulation/VC boots/Sub Q heparin    #Uncontrolled DM-2:  -A1c 14  -basal/bolus regimen w/ SS coverage  -ADA diet  -Cont. ARB  -hold metformin while admitted   -diabetic education    #HTN:  -DASH diet  -Cont. Losartan     Discussed with Patient/Family at bedside and Podiatry

## 2024-05-01 NOTE — H&P ADULT - ASSESSMENT
57 y/o  59 y/o male with left diabetic foot infection, Uncontrolled DM-2, HTN    Left diabetic foot infection:  -No sepsis criteria  -Exam as above  -No soft tissue air on xray   -No evidence of NF  -No hx of MRSA  -Check MRSA pcr  -Change to Zosyn for polymicrobial coverage with hx of poorly controlled DM  -Trend WBC/Temp  -local wound care  -JUAN RAMON  -Podiatry f/u to eval for debridement  -DVT-P with ambulation/VC boots/Sub Q heparin    Uncontrolled DM-2:  -A1c 14  -Start on basal/bolus regimen w/ SS coverage  -ADA diet  -Cont. ARB  -hold metformin while admitted     HTN:  -DASH diet  -Cont. Losartan     Discussed with ED staff, Patient/Family at bedside

## 2024-05-02 ENCOUNTER — RESULT REVIEW (OUTPATIENT)
Age: 59
End: 2024-05-02

## 2024-05-02 LAB
ANION GAP SERPL CALC-SCNC: 11 MMOL/L — SIGNIFICANT CHANGE UP (ref 5–17)
BUN SERPL-MCNC: 21.1 MG/DL — HIGH (ref 8–20)
CALCIUM SERPL-MCNC: 8.8 MG/DL — SIGNIFICANT CHANGE UP (ref 8.4–10.5)
CHLORIDE SERPL-SCNC: 98 MMOL/L — SIGNIFICANT CHANGE UP (ref 96–108)
CO2 SERPL-SCNC: 27 MMOL/L — SIGNIFICANT CHANGE UP (ref 22–29)
CREAT SERPL-MCNC: 0.75 MG/DL — SIGNIFICANT CHANGE UP (ref 0.5–1.3)
EGFR: 103 ML/MIN/1.73M2 — SIGNIFICANT CHANGE UP
GLUCOSE BLDC GLUCOMTR-MCNC: 160 MG/DL — HIGH (ref 70–99)
GLUCOSE BLDC GLUCOMTR-MCNC: 205 MG/DL — HIGH (ref 70–99)
GLUCOSE BLDC GLUCOMTR-MCNC: 222 MG/DL — HIGH (ref 70–99)
GLUCOSE BLDC GLUCOMTR-MCNC: 268 MG/DL — HIGH (ref 70–99)
GLUCOSE SERPL-MCNC: 218 MG/DL — HIGH (ref 70–99)
HCT VFR BLD CALC: 36.2 % — LOW (ref 39–50)
HGB BLD-MCNC: 12.5 G/DL — LOW (ref 13–17)
MAGNESIUM SERPL-MCNC: 2.2 MG/DL — SIGNIFICANT CHANGE UP (ref 1.6–2.6)
MCHC RBC-ENTMCNC: 30.3 PG — SIGNIFICANT CHANGE UP (ref 27–34)
MCHC RBC-ENTMCNC: 34.5 GM/DL — SIGNIFICANT CHANGE UP (ref 32–36)
MCV RBC AUTO: 87.7 FL — SIGNIFICANT CHANGE UP (ref 80–100)
PHOSPHATE SERPL-MCNC: 3.1 MG/DL — SIGNIFICANT CHANGE UP (ref 2.4–4.7)
PLATELET # BLD AUTO: 274 K/UL — SIGNIFICANT CHANGE UP (ref 150–400)
POTASSIUM SERPL-MCNC: 4 MMOL/L — SIGNIFICANT CHANGE UP (ref 3.5–5.3)
POTASSIUM SERPL-SCNC: 4 MMOL/L — SIGNIFICANT CHANGE UP (ref 3.5–5.3)
RBC # BLD: 4.13 M/UL — LOW (ref 4.2–5.8)
RBC # FLD: 11.3 % — SIGNIFICANT CHANGE UP (ref 10.3–14.5)
SODIUM SERPL-SCNC: 136 MMOL/L — SIGNIFICANT CHANGE UP (ref 135–145)
WBC # BLD: 5.65 K/UL — SIGNIFICANT CHANGE UP (ref 3.8–10.5)
WBC # FLD AUTO: 5.65 K/UL — SIGNIFICANT CHANGE UP (ref 3.8–10.5)

## 2024-05-02 PROCEDURE — 93306 TTE W/DOPPLER COMPLETE: CPT | Mod: 26

## 2024-05-02 PROCEDURE — 99232 SBSQ HOSP IP/OBS MODERATE 35: CPT | Mod: GC

## 2024-05-02 PROCEDURE — 99233 SBSQ HOSP IP/OBS HIGH 50: CPT

## 2024-05-02 RX ADMIN — HEPARIN SODIUM 5000 UNIT(S): 5000 INJECTION INTRAVENOUS; SUBCUTANEOUS at 05:16

## 2024-05-02 RX ADMIN — SODIUM CHLORIDE 3 MILLILITER(S): 9 INJECTION INTRAMUSCULAR; INTRAVENOUS; SUBCUTANEOUS at 21:10

## 2024-05-02 RX ADMIN — Medication 2: at 21:48

## 2024-05-02 RX ADMIN — Medication 5 UNIT(S): at 08:19

## 2024-05-02 RX ADMIN — INSULIN GLARGINE 20 UNIT(S): 100 INJECTION, SOLUTION SUBCUTANEOUS at 21:48

## 2024-05-02 RX ADMIN — HEPARIN SODIUM 5000 UNIT(S): 5000 INJECTION INTRAVENOUS; SUBCUTANEOUS at 17:12

## 2024-05-02 RX ADMIN — Medication 4: at 17:12

## 2024-05-02 RX ADMIN — SODIUM CHLORIDE 3 MILLILITER(S): 9 INJECTION INTRAMUSCULAR; INTRAVENOUS; SUBCUTANEOUS at 14:38

## 2024-05-02 RX ADMIN — Medication 6: at 08:19

## 2024-05-02 RX ADMIN — PIPERACILLIN AND TAZOBACTAM 25 GRAM(S): 4; .5 INJECTION, POWDER, LYOPHILIZED, FOR SOLUTION INTRAVENOUS at 14:47

## 2024-05-02 RX ADMIN — Medication 5 UNIT(S): at 17:13

## 2024-05-02 RX ADMIN — PIPERACILLIN AND TAZOBACTAM 25 GRAM(S): 4; .5 INJECTION, POWDER, LYOPHILIZED, FOR SOLUTION INTRAVENOUS at 05:16

## 2024-05-02 RX ADMIN — SODIUM CHLORIDE 3 MILLILITER(S): 9 INJECTION INTRAMUSCULAR; INTRAVENOUS; SUBCUTANEOUS at 05:05

## 2024-05-02 RX ADMIN — PIPERACILLIN AND TAZOBACTAM 25 GRAM(S): 4; .5 INJECTION, POWDER, LYOPHILIZED, FOR SOLUTION INTRAVENOUS at 21:48

## 2024-05-02 RX ADMIN — LOSARTAN POTASSIUM 100 MILLIGRAM(S): 100 TABLET, FILM COATED ORAL at 05:16

## 2024-05-02 NOTE — PROGRESS NOTE ADULT - ASSESSMENT
A:  DFU left foot  Left foot cellulitis  PVD      P:  Pt evaluated and chart reviewed  Vitals reviewed, no leukocytosis  Reviewed L foor x-rays- results above   Applied Betadine paint, adaptic, DSD to left foot, patient to keep dressing D/C/I  Patient to be wb as tolerated to b/l LE  Appreciate vascular consult- angiogram 5/3  Recommend LWC for now  C/w with IV abx per ID/IM  Discussed importance of daily foot examinations and proper shoe gear and to importance of lower Fasting Blood Glucose levels. Strict glycemic control for optimal wound healing.  Foot wound appears stable at this time. Recommend local wound care. Podiatry to sign off. Reconsult as needed.   Patient to follow up with Dr. Vega in office [address] [place] [address] office, call [phone] to make an appointment  Discussed with Dr. Vega    WCO: Xerform, gauze, abd pad, kerlex (tape close) to be changed 3x/week.    A:  DFU left foot  Left foot cellulitis  PVD      P:  Pt evaluated and chart reviewed  Vitals reviewed, no leukocytosis  Reviewed L foor x-rays- results above   Applied Betadine paint, adaptic, DSD to left foot, patient to keep dressing D/C/I  Patient to be wb as tolerated to b/l LE  Appreciate vascular consult- angiogram 5/3  Recommend LWC for now. Would benefit from outpatient excisional debridement and eventual graft application once blow established  C/w with IV abx per ID/IM  Discussed importance of daily foot examinations and proper shoe gear and to importance of lower Fasting Blood Glucose levels. Strict glycemic control for optimal wound healing.  Foot wound appears stable at this time. Recommend local wound care. Podiatry to sign off. Reconsult as needed.   Patient to follow up at [facility] for advanced wound care [phone], or at the office [address] [place] [address] office, call [phone] to make an appointment  Evaluated and discussed with Dr. Vega    WCO: Xeroform, gauze, abd pad, kerlex (tape close) to be changed 3x/week.

## 2024-05-02 NOTE — ADVANCED PRACTICE NURSE CONSULT - ASSESSMENT
went to see pt in am pt is a+ox3 co 0 pain, family @ bedside provided comfort. pt states in english that he has diabetes for 9 years and was taking metformin [id number]. he has  a glucosemeter that he uses in am and get numbers between 200-400. pt and family were eduated about the importance of using insulin and the effeect of high bg on wound healing. . pt verbalized understanding. he was eduated abut ss of hypoglycemia and treatment written material about ss of hypoglycemia and treatment provided inspanish. he was also educated about the use of inuslin pen writen material in Dominican about the use of insuln pen an alternate injectioin site provided. written material about the 2 types of insulin provided. pt and family were invited to the diabetes [place] club on june 9 2024 yearly calendar provided 
return to see pt in am pt is a+ox3 co 0 pain. pt and family were educated in Croatian bout healthy eating habitsthe plate method was used to teach portions. writen material in Croatian about the meal plan provided. pt is continue to practice insulin injection using prefilled insulin syringe and rn supervision.

## 2024-05-02 NOTE — ADVANCED PRACTICE NURSE CONSULT - RECOMMEDATIONS
continue diabetes self management educaiton
continue diabetes self management education  pt to inject all insulin doses while in the hospital using prefilled insulin syringe and rn supervision  insulin pen teaching onluding pen setting testing dosing and injection  pls consider endocrine consult ( pt will fu w endocrine and podiatry in [facility])  pls consider dc pt on basal bolus  insulin   cc- pls set home care pt is new to insulin

## 2024-05-02 NOTE — PROGRESS NOTE ADULT - SUBJECTIVE AND OBJECTIVE BOX
HPI/Overnight Events:   Patient seen and examined at bedside this AM. No overnight events. Sleeping.    Vital Signs Last 24 Hrs  T(C): 37.2 (02 May 2024 04:37), Max: 37.2 (01 May 2024 23:37)  T(F): 99 (02 May 2024 04:37), Max: 99 (01 May 2024 23:37)  HR: 74 (02 May 2024 04:37) (74 - 89)  BP: 121/69 (02 May 2024 04:37) (116/65 - 132/75)  BP(mean): --  RR: 18 (02 May 2024 04:37) (17 - 18)  SpO2: 96% (02 May 2024 04:37) (95% - 99%)    Parameters below as of 02 May 2024 04:37  Patient On (Oxygen Delivery Method): room air        I&O's Detail      MEDICATIONS  (STANDING):  dextrose 10% Bolus 125 milliLiter(s) IV Bolus once  dextrose 5%. 1000 milliLiter(s) (50 mL/Hr) IV Continuous <Continuous>  dextrose 50% Injectable 25 Gram(s) IV Push once  glucagon  Injectable 1 milliGRAM(s) IntraMuscular once  heparin   Injectable 5000 Unit(s) SubCutaneous every 12 hours  influenza   Vaccine 0.5 milliLiter(s) IntraMuscular once  insulin glargine Injectable (LANTUS) 20 Unit(s) SubCutaneous at bedtime  insulin lispro (ADMELOG) corrective regimen sliding scale   SubCutaneous Before meals and at bedtime  insulin lispro Injectable (ADMELOG) 5 Unit(s) SubCutaneous before lunch  insulin lispro Injectable (ADMELOG) 5 Unit(s) SubCutaneous before dinner  insulin lispro Injectable (ADMELOG) 5 Unit(s) SubCutaneous before breakfast  losartan 100 milliGRAM(s) Oral daily  piperacillin/tazobactam IVPB.. 3.375 Gram(s) IV Intermittent every 8 hours  sodium chloride 0.9% lock flush 3 milliLiter(s) IV Push every 8 hours    MEDICATIONS  (PRN):  acetaminophen     Tablet .. 650 milliGRAM(s) Oral every 6 hours PRN Temp greater or equal to 38C (100.4F), Mild Pain (1 - 3)  aluminum hydroxide/magnesium hydroxide/simethicone Suspension 30 milliLiter(s) Oral every 4 hours PRN Dyspepsia  dextrose Oral Gel 15 Gram(s) Oral once PRN Blood Glucose LESS THAN 70 milliGRAM(s)/deciliter  melatonin 3 milliGRAM(s) Oral at bedtime PRN Insomnia  ondansetron Injectable 4 milliGRAM(s) IV Push every 8 hours PRN Nausea and/or Vomiting      Physical Exam  Constitutional: patient resting comfortably in bed, in no acute distress  HEENT: EOMI, MMM  Respiratory: non-labored breathing on RA  Cardiovascular: RRR  Gastrointestinal: abdomen soft, non-tender, non-distended, no rebound tenderness/guarding  Musculoskeletal: significant edema, left foot with moderate size dorsal wound, fibrinous  with surrounding erythema   Vascular: non palpable left pedals, secondary to edema  Pulses:   Right:                                                                          Left:  FEM [ ]2+ [ ]1+ [ ]doppler                                             FEM [ ]2+ [ ]1+ [ ]doppler    POP [ ]2+ [ ]1+ [ ]doppler                                             POP [ x2+ [ ]1+ [ ]doppler    DP [ x]2+ [ ]1+ [ ]doppler                                                DP [ ]2+ [ ]1+ [ x]doppler  P[x ]2+ [ ]1+ [ ]doppler                                                  PT [ ]2+ [ ]1+ [x ]doppler  Neurological: reduced motor fxn to the left foot due to edema    LABS:                        12.2   5.23  )-----------( 283      ( 01 May 2024 03:41 )             35.8     05-01    133<L>  |  94<L>  |  25.0<H>  ----------------------------<  208<H>  3.7   |  29.0  |  0.69    Ca    9.0      01 May 2024 03:41  Phos  3.2     05-01  Mg     2.2     05-01    TPro  7.3  /  Alb  3.6  /  TBili  0.3<L>  /  DBili  x   /  AST  14  /  ALT  10  /  AlkPhos  90  04-30      Urinalysis Basic - ( 01 May 2024 03:41 )    Assessment:      Plan  - Will plan for angiogram tomorrow 5/3  - DVT ppx:   - Dispo:     WAQAS Rod MD HPI/Overnight Events:   Patient seen and examined at bedside this AM. No overnight events. Sleeping.    Vital Signs Last 24 Hrs  T(C): 37.2 (02 May 2024 04:37), Max: 37.2 (01 May 2024 23:37)  T(F): 99 (02 May 2024 04:37), Max: 99 (01 May 2024 23:37)  HR: 74 (02 May 2024 04:37) (74 - 89)  BP: 121/69 (02 May 2024 04:37) (116/65 - 132/75)  BP(mean): --  RR: 18 (02 May 2024 04:37) (17 - 18)  SpO2: 96% (02 May 2024 04:37) (95% - 99%)    Parameters below as of 02 May 2024 04:37  Patient On (Oxygen Delivery Method): room air        I&O's Detail      MEDICATIONS  (STANDING):  dextrose 10% Bolus 125 milliLiter(s) IV Bolus once  dextrose 5%. 1000 milliLiter(s) (50 mL/Hr) IV Continuous <Continuous>  dextrose 50% Injectable 25 Gram(s) IV Push once  glucagon  Injectable 1 milliGRAM(s) IntraMuscular once  heparin   Injectable 5000 Unit(s) SubCutaneous every 12 hours  influenza   Vaccine 0.5 milliLiter(s) IntraMuscular once  insulin glargine Injectable (LANTUS) 20 Unit(s) SubCutaneous at bedtime  insulin lispro (ADMELOG) corrective regimen sliding scale   SubCutaneous Before meals and at bedtime  insulin lispro Injectable (ADMELOG) 5 Unit(s) SubCutaneous before lunch  insulin lispro Injectable (ADMELOG) 5 Unit(s) SubCutaneous before dinner  insulin lispro Injectable (ADMELOG) 5 Unit(s) SubCutaneous before breakfast  losartan 100 milliGRAM(s) Oral daily  piperacillin/tazobactam IVPB.. 3.375 Gram(s) IV Intermittent every 8 hours  sodium chloride 0.9% lock flush 3 milliLiter(s) IV Push every 8 hours    MEDICATIONS  (PRN):  acetaminophen     Tablet .. 650 milliGRAM(s) Oral every 6 hours PRN Temp greater or equal to 38C (100.4F), Mild Pain (1 - 3)  aluminum hydroxide/magnesium hydroxide/simethicone Suspension 30 milliLiter(s) Oral every 4 hours PRN Dyspepsia  dextrose Oral Gel 15 Gram(s) Oral once PRN Blood Glucose LESS THAN 70 milliGRAM(s)/deciliter  melatonin 3 milliGRAM(s) Oral at bedtime PRN Insomnia  ondansetron Injectable 4 milliGRAM(s) IV Push every 8 hours PRN Nausea and/or Vomiting      Physical Exam  Constitutional: patient resting comfortably in bed, in no acute distress  HEENT: EOMI, MMM  Respiratory: non-labored breathing on RA  Cardiovascular: RRR  Gastrointestinal: abdomen soft, non-tender, non-distended, no rebound tenderness/guarding  Musculoskeletal: significant edema, left foot with moderate size dorsal wound, fibrinous  with surrounding erythema   Vascular: non palpable left pedals, secondary to edema  Pulses:   Right:                                                                          Left:  FEM [ ]2+ [ ]1+ [ ]doppler                                             FEM [ ]2+ [ ]1+ [ ]doppler    POP [ ]2+ [ ]1+ [ ]doppler                                             POP [ x2+ [ ]1+ [ ]doppler    DP [ x]2+ [ ]1+ [ ]doppler                                                DP [ ]2+ [ ]1+ [ x]doppler  P[x ]2+ [ ]1+ [ ]doppler                                                  PT [ ]2+ [ ]1+ [x ]doppler  Neurological: reduced motor fxn to the left foot due to edema    LABS:                        12.2   5.23  )-----------( 283      ( 01 May 2024 03:41 )             35.8     05-01    133<L>  |  94<L>  |  25.0<H>  ----------------------------<  208<H>  3.7   |  29.0  |  0.69    Ca    9.0      01 May 2024 03:41  Phos  3.2     05-01  Mg     2.2     05-01    TPro  7.3  /  Alb  3.6  /  TBili  0.3<L>  /  DBili  x   /  AST  14  /  ALT  10  /  AlkPhos  90  04-30      Urinalysis Basic - ( 01 May 2024 03:41 )    Assessment:  59yo M w/left foot traumatic non healing wound with associated cellulitis and DM.    Plan  - Will plan for angiogram tomorrow 5/3  - No need for JUAN RAMON/PVR  - NPO @ [facility], AM labs, IVF  - Continue IV Zosyn  - DVT ppx: SQH  - Dispo: pending progress    M. Vicky Viola, MD

## 2024-05-02 NOTE — CONSULT NOTE ADULT - SUBJECTIVE AND OBJECTIVE BOX
Formerly [facility], [facility]                              [address] [place] [address]                                                 PHONE: [phone]                                                 FAX: [phone]  ------------------------------------------------------------------------------------------------    Chief complaint:  Left foot infection    58y Male with past medical history as under presented to ED c/o 1 week of left foot swelling, redness, and large wound to top of foot. His family advised him to come to the ED for further evaluation. Denies fever, chills, N/V, SOB, or CP. In the ED vitals stable, left foot with large 8x6cm area of denuded skin with fibrinous exudates purulence with surrounding erythema, edema, and warmth. Labs with glucose of 554, A1c of 14.  Plan for angiogram as per vascular surgery.  At the time of evaluation, pt reports feeling pain in his left foot but no other symptoms. He reports no prior cardiac history. He is limited now due to foot pain but reports he had been active prior to the wound and was playing basketball without any chest pain or shortness of breath. He has lost weight as per family at bedside.    PAST MEDICAL & SURGICAL HISTORY:  HTN (hypertension)      Diabetes      No significant past surgical history        No Known Allergies    Vital Signs Last 24 Hrs  T(C): 37.1 (02 May 2024 12:00), Max: 37.2 (01 May 2024 23:37)  T(F): 98.8 (02 May 2024 12:00), Max: 99 (01 May 2024 23:37)  HR: 87 (02 May 2024 12:00) (74 - 89)  BP: 121/56 (02 May 2024 12:00) (108/66 - 132/75)  BP(mean): --  RR: 18 (02 May 2024 12:00) (17 - 18)  SpO2: 99% (02 May 2024 12:00) (95% - 99%)    Parameters below as of 02 May 2024 12:00  Patient On (Oxygen Delivery Method): room air      RELEVANT PHYSICAL EXAM:  Neck: No obvious JVD  Cardiovascular: regular S1, S2  Respiratory: Lungs clear to auscultation; no crepitations, no wheeze  Musculoskeletal: L leg bandaged    LABS:                        12.5   5.65  )-----------( 274      ( 02 May 2024 07:29 )             36.2     05-02    136  |  98  |  21.1<H>  ----------------------------<  218<H>  4.0   |  27.0  |  0.75    Ca    8.8      02 May 2024 07:29  Phos  3.1     05-02  Mg     2.2     05-02    TPro  7.3  /  Alb  3.6  /  TBili  0.3<L>  /  DBili  x   /  AST  14  /  ALT  10  /  AlkPhos  90  04-30    CARDIOLOGY TESTING:(reviewed)   ECG (Independent visualization): Ordered    ECHOCARDIOGRAM :Ordered    MEDICATIONS:(reviewed)  Home Medications:  MEDICATIONS  (STANDING):  dextrose 10% Bolus 125 milliLiter(s) IV Bolus once  dextrose 5%. 1000 milliLiter(s) (50 mL/Hr) IV Continuous <Continuous>  dextrose 50% Injectable 25 Gram(s) IV Push once  glucagon  Injectable 1 milliGRAM(s) IntraMuscular once  heparin   Injectable 5000 Unit(s) SubCutaneous every 12 hours  influenza   Vaccine 0.5 milliLiter(s) IntraMuscular once  insulin glargine Injectable (LANTUS) 20 Unit(s) SubCutaneous at bedtime  insulin lispro (ADMELOG) corrective regimen sliding scale   SubCutaneous Before meals and at bedtime  insulin lispro Injectable (ADMELOG) 5 Unit(s) SubCutaneous before breakfast  insulin lispro Injectable (ADMELOG) 5 Unit(s) SubCutaneous before lunch  insulin lispro Injectable (ADMELOG) 5 Unit(s) SubCutaneous before dinner  losartan 100 milliGRAM(s) Oral daily  piperacillin/tazobactam IVPB.. 3.375 Gram(s) IV Intermittent every 8 hours  sodium chloride 0.9% lock flush 3 milliLiter(s) IV Push every 8 hours    MEDICATIONS  (PRN):  acetaminophen     Tablet .. 650 milliGRAM(s) Oral every 6 hours PRN Temp greater or equal to 38C (100.4F), Mild Pain (1 - 3)  aluminum hydroxide/magnesium hydroxide/simethicone Suspension 30 milliLiter(s) Oral every 4 hours PRN Dyspepsia  dextrose Oral Gel 15 Gram(s) Oral once PRN Blood Glucose LESS THAN 70 milliGRAM(s)/deciliter  melatonin 3 milliGRAM(s) Oral at bedtime PRN Insomnia  ondansetron Injectable 4 milliGRAM(s) IV Push every 8 hours PRN Nausea and/or Vomiting    ASSESSMENT AND PLAN:    58y Male with prior history of HTN, DM w/left foot traumatic non healing wound with associated cellulitis and DM. Plan for angiogram as per vascular surgery.    Pre-op cardiovascular evaluation for low/moderate risk procedure  - ECG and Echo ordered  - Will review when done.  - If above unremarkable, can proceed for planned intermediate risk angiogram procedure with moderate  perioperative risk without cardiac contraindications  - Close monitoring of BP and volume status  - Post-op ECG and telemetry monitoring  - Will need outpt ischemic evaluation once infection and acute issues resolve    HTN  - Continue losartan    Dyslipidemia  - Consider adding statin    Plan discussed with Dr. Wang    Additional history obtained from family  [independent historian] and plan of care discussed at bedside                                                          [facility]                              [address] [place] [address]                                                 PHONE: [phone]                                                 FAX: [phone]  ------------------------------------------------------------------------------------------------    Chief complaint:  Left foot infection    58y Male with past medical history as under presented to ED c/o 1 week of left foot swelling, redness, and large wound to top of foot. His family advised him to come to the ED for further evaluation. Denies fever, chills, N/V, SOB, or CP. In the ED vitals stable, left foot with large 8x6cm area of denuded skin with fibrinous exudates purulence with surrounding erythema, edema, and warmth. Labs with glucose of 554, A1c of 14.  Plan for angiogram as per vascular surgery.  At the time of evaluation, pt reports feeling pain in his left foot but no other symptoms. He reports no prior cardiac history. He is limited now due to foot pain but reports he had been active prior to the wound and was playing basketball without any chest pain or shortness of breath. He has lost weight as per family at bedside.    PAST MEDICAL & SURGICAL HISTORY:  HTN (hypertension)      Diabetes      No significant past surgical history        No Known Allergies    Vital Signs Last 24 Hrs  T(C): 37.1 (02 May 2024 12:00), Max: 37.2 (01 May 2024 23:37)  T(F): 98.8 (02 May 2024 12:00), Max: 99 (01 May 2024 23:37)  HR: 87 (02 May 2024 12:00) (74 - 89)  BP: 121/56 (02 May 2024 12:00) (108/66 - 132/75)  BP(mean): --  RR: 18 (02 May 2024 12:00) (17 - 18)  SpO2: 99% (02 May 2024 12:00) (95% - 99%)    Parameters below as of 02 May 2024 12:00  Patient On (Oxygen Delivery Method): room air      RELEVANT PHYSICAL EXAM:  Neck: No obvious JVD  Cardiovascular: regular S1, S2  Respiratory: Lungs clear to auscultation; no crepitations, no wheeze  Musculoskeletal: L leg bandaged    LABS:                        12.5   5.65  )-----------( 274      ( 02 May 2024 07:29 )             36.2     05-02    136  |  98  |  21.1<H>  ----------------------------<  218<H>  4.0   |  27.0  |  0.75    Ca    8.8      02 May 2024 07:29  Phos  3.1     05-02  Mg     2.2     05-02    TPro  7.3  /  Alb  3.6  /  TBili  0.3<L>  /  DBili  x   /  AST  14  /  ALT  10  /  AlkPhos  90  04-30    CARDIOLOGY TESTING:(reviewed)   ECG (Independent visualization): Ordered    ECHOCARDIOGRAM :Ordered    MEDICATIONS:(reviewed)  Home Medications:  MEDICATIONS  (STANDING):  dextrose 10% Bolus 125 milliLiter(s) IV Bolus once  dextrose 5%. 1000 milliLiter(s) (50 mL/Hr) IV Continuous <Continuous>  dextrose 50% Injectable 25 Gram(s) IV Push once  glucagon  Injectable 1 milliGRAM(s) IntraMuscular once  heparin   Injectable 5000 Unit(s) SubCutaneous every 12 hours  influenza   Vaccine 0.5 milliLiter(s) IntraMuscular once  insulin glargine Injectable (LANTUS) 20 Unit(s) SubCutaneous at bedtime  insulin lispro (ADMELOG) corrective regimen sliding scale   SubCutaneous Before meals and at bedtime  insulin lispro Injectable (ADMELOG) 5 Unit(s) SubCutaneous before breakfast  insulin lispro Injectable (ADMELOG) 5 Unit(s) SubCutaneous before lunch  insulin lispro Injectable (ADMELOG) 5 Unit(s) SubCutaneous before dinner  losartan 100 milliGRAM(s) Oral daily  piperacillin/tazobactam IVPB.. 3.375 Gram(s) IV Intermittent every 8 hours  sodium chloride 0.9% lock flush 3 milliLiter(s) IV Push every 8 hours    MEDICATIONS  (PRN):  acetaminophen     Tablet .. 650 milliGRAM(s) Oral every 6 hours PRN Temp greater or equal to 38C (100.4F), Mild Pain (1 - 3)  aluminum hydroxide/magnesium hydroxide/simethicone Suspension 30 milliLiter(s) Oral every 4 hours PRN Dyspepsia  dextrose Oral Gel 15 Gram(s) Oral once PRN Blood Glucose LESS THAN 70 milliGRAM(s)/deciliter  melatonin 3 milliGRAM(s) Oral at bedtime PRN Insomnia  ondansetron Injectable 4 milliGRAM(s) IV Push every 8 hours PRN Nausea and/or Vomiting    ASSESSMENT AND PLAN:    58y Male with prior history of HTN, DM w/left foot traumatic non healing wound with associated cellulitis and DM. Plan for angiogram as per vascular surgery.    Pre-op cardiovascular evaluation for intermediate risk procedure  - ECG and Echo ordered  - Will review when done.  - If above unremarkable, can proceed for planned intermediate risk angiogram procedure with moderate  perioperative risk without cardiac contraindications  - Close monitoring of BP and volume status  - Post-op ECG and telemetry monitoring  - Will need outpt ischemic evaluation once infection and acute issues resolve    HTN  - Continue losartan    Dyslipidemia  - Consider adding statin    Plan discussed with Dr. Wang    Additional history obtained from family  [independent historian] and plan of care discussed at bedside

## 2024-05-02 NOTE — PROGRESS NOTE ADULT - SUBJECTIVE AND OBJECTIVE BOX
Interval: No acute overnight events. Patient scheduled for angiogram tomorrow morning. Changed dressing today. No pain to feet. No other pedal complaints.     59 y/o male with hx of HTN/DM-2 presents to ED c/o 1 week of left foot swelling, redness, and large wound to top of foot. He states  he was working outside last week and later in the evening when he took his shoe and sock off the skin on top of his foot sloughed off. Since that time he was putting Neosporin and he was rubbing aspirin in the wound. Today when he woke up his foot was swollen, red, and there was draining from the wound. His family advised him to come to the ED for further evaluation. Denies fever, chills, N/V, SOB, or CP. In the ED vitals stable, left foot with large 8x6cm area of denuded skin with fibrinous exudates purulence with surrounding erythema, edema, and warmth. No crepitus, no fluctuance noted. Xray with no soft tissue air. Labs with glucose of 554, A1c of 14. normal AG/HCO3, Cultures sent, started on Clindamycin. Denies any other complaints at this time.          Medications acetaminophen     Tablet .. 650 milliGRAM(s) Oral every 6 hours PRN  aluminum hydroxide/magnesium hydroxide/simethicone Suspension 30 milliLiter(s) Oral every 4 hours PRN  dextrose 10% Bolus 125 milliLiter(s) IV Bolus once  dextrose 5%. 1000 milliLiter(s) IV Continuous <Continuous>  dextrose 50% Injectable 25 Gram(s) IV Push once  dextrose Oral Gel 15 Gram(s) Oral once PRN  glucagon  Injectable 1 milliGRAM(s) IntraMuscular once  heparin   Injectable 5000 Unit(s) SubCutaneous every 12 hours  influenza   Vaccine 0.5 milliLiter(s) IntraMuscular once  insulin glargine Injectable (LANTUS) 20 Unit(s) SubCutaneous at bedtime  insulin lispro (ADMELOG) corrective regimen sliding scale   SubCutaneous Before meals and at bedtime  insulin lispro Injectable (ADMELOG) 5 Unit(s) SubCutaneous before breakfast  insulin lispro Injectable (ADMELOG) 5 Unit(s) SubCutaneous before lunch  insulin lispro Injectable (ADMELOG) 5 Unit(s) SubCutaneous before dinner  losartan 100 milliGRAM(s) Oral daily  melatonin 3 milliGRAM(s) Oral at bedtime PRN  ondansetron Injectable 4 milliGRAM(s) IV Push every 8 hours PRN  piperacillin/tazobactam IVPB.. 3.375 Gram(s) IV Intermittent every 8 hours  sodium chloride 0.9% lock flush 3 milliLiter(s) IV Push every 8 hours    FHFH: diabetes mellitus    ,   PMHHTN (hypertension)    Diabetes       PSHNo significant past surgical history        Labs                          12.5   5.65  )-----------( 274      ( 02 May 2024 07:29 )             36.2      05-02    136  |  98  |  21.1<H>  ----------------------------<  218<H>  4.0   |  27.0  |  0.75    Ca    8.8      02 May 2024 07:29  Phos  3.1     05-02  Mg     2.2     05-02       Vital Signs Last 24 Hrs  T(C): 36.3 (02 May 2024 19:20), Max: 37.2 (01 May 2024 23:37)  T(F): 97.4 (02 May 2024 19:20), Max: 99 (01 May 2024 23:37)  HR: 85 (02 May 2024 19:20) (74 - 89)  BP: 162/79 (02 May 2024 19:20) (108/66 - 164/83)  BP(mean): --  RR: 18 (02 May 2024 19:20) (18 - 18)  SpO2: 99% (02 May 2024 19:20) (95% - 99%)    Parameters below as of 02 May 2024 19:20  Patient On (Oxygen Delivery Method): room air      Sedimentation Rate, Erythrocyte: 56 mm/hr (05-01-24 @ 21:26)         C-Reactive Protein: 27 mg/L (05-01-24 @ 03:41)   WBC Count: 5.65 K/uL (05-02-24 @ 07:29)      ROS: Unremarkable outside HPI      LE Focused Exam  Vascular: Pulses non-palpable b/l. CFT ~6 secs x 10. Temp Gradient warm to warm L. Pedal hair absent. + non-pitting edema L foot,  Dermatological: L dorsal foot wound measuring 9x5.5x0.1cm with a fibrotic cap and granular boarder with surrounding erythema. No drainage, neg PTB, -soft tissue crepitus - fluctuance,  -malodor  Neurological: Patient is awake, alert and oriented x3. Gross Epicritic sensation is absent via ipswitch test 0/4 b/l. Vibratory sensation is diminished at the 1st MTPJ  MSK: Muscle strength 5/5 was notes in all compartments. NO pain on palpation. Arch height 2/5 on-WB, joint ROM wnl with no crepitation. Negative jaspreet sign b/l      < from: Xray Foot AP + Lateral + Oblique, Left (04.30.24 @ 19:39) >  IMPRESSION:  1.  No fracture.  2.  Mild generalized soft tissue swelling without subcutaneous emphysema   or plain film evidence of osteomyelitis

## 2024-05-02 NOTE — PROGRESS NOTE ADULT - SUBJECTIVE AND OBJECTIVE BOX
Hospitalist Progress Note    Chief Complaint:  L Foot Cellulitis    SUBJECTIVE / OVERNIGHT EVENTS:  No events overnight, patient seen at bedside, in NAD, no complaints today. Patient denies chest pain, SOB, abd pain, N/V, fever, chills, dysuria or any other complaints. All remainder ROS negative.     MEDICATIONS  (STANDING):  dextrose 10% Bolus 125 milliLiter(s) IV Bolus once  dextrose 5%. 1000 milliLiter(s) (50 mL/Hr) IV Continuous <Continuous>  dextrose 50% Injectable 25 Gram(s) IV Push once  glucagon  Injectable 1 milliGRAM(s) IntraMuscular once  heparin   Injectable 5000 Unit(s) SubCutaneous every 12 hours  influenza   Vaccine 0.5 milliLiter(s) IntraMuscular once  insulin glargine Injectable (LANTUS) 20 Unit(s) SubCutaneous at bedtime  insulin lispro (ADMELOG) corrective regimen sliding scale   SubCutaneous Before meals and at bedtime  insulin lispro Injectable (ADMELOG) 5 Unit(s) SubCutaneous before lunch  insulin lispro Injectable (ADMELOG) 5 Unit(s) SubCutaneous before dinner  insulin lispro Injectable (ADMELOG) 5 Unit(s) SubCutaneous before breakfast  losartan 100 milliGRAM(s) Oral daily  piperacillin/tazobactam IVPB.. 3.375 Gram(s) IV Intermittent every 8 hours  sodium chloride 0.9% lock flush 3 milliLiter(s) IV Push every 8 hours    MEDICATIONS  (PRN):  acetaminophen     Tablet .. 650 milliGRAM(s) Oral every 6 hours PRN Temp greater or equal to 38C (100.4F), Mild Pain (1 - 3)  aluminum hydroxide/magnesium hydroxide/simethicone Suspension 30 milliLiter(s) Oral every 4 hours PRN Dyspepsia  dextrose Oral Gel 15 Gram(s) Oral once PRN Blood Glucose LESS THAN 70 milliGRAM(s)/deciliter  melatonin 3 milliGRAM(s) Oral at bedtime PRN Insomnia  ondansetron Injectable 4 milliGRAM(s) IV Push every 8 hours PRN Nausea and/or Vomiting        I&O's Summary      PHYSICAL EXAM:  Vital Signs Last 24 Hrs  T(C): 37.1 (02 May 2024 12:00), Max: 37.2 (01 May 2024 23:37)  T(F): 98.8 (02 May 2024 12:00), Max: 99 (01 May 2024 23:37)  HR: 87 (02 May 2024 12:00) (74 - 89)  BP: 121/56 (02 May 2024 12:00) (108/66 - 132/75)  BP(mean): --  RR: 18 (02 May 2024 12:00) (17 - 18)  SpO2: 99% (02 May 2024 12:00) (95% - 99%)    Parameters below as of 02 May 2024 12:00  Patient On (Oxygen Delivery Method): room air        CONSTITUTIONAL: NAD,  well-groomed  HEENMT: NC/AT, PERRLA, EOMI, Moist oral mucosa, no pharyngeal injection or exudates; normal dentition  RESPIRATORY: BLAE, No adventitious Lungs Sounds  CARDIOVASCULAR: S1/S2+, RRR, no MRG, No LE Edema  ABDOMEN: Soft, NT/ND, BS+, No guarding  MSK:  Moves limbs with purpose and direction; no clubbing or cyanosis of digits; no joint swelling or tenderness to palpation  PSYCH: normal mood and affect  NEUROLOGY: AAOx4, CN 2-12 are intact and symmetric; no gross sensory deficits;   SKIN: Warm, Dry, LLE Wound dorsum of left foot with large area of denuded skin, exudates/erythema, edema/warmth over    LABS:                        12.5   5.65  )-----------( 274      ( 02 May 2024 07:29 )             36.2     05-02    136  |  98  |  21.1<H>  ----------------------------<  218<H>  4.0   |  27.0  |  0.75    Ca    8.8      02 May 2024 07:29  Phos  3.1     05-02  Mg     2.2     05-02    TPro  7.3  /  Alb  3.6  /  TBili  0.3<L>  /  DBili  x   /  AST  14  /  ALT  10  /  AlkPhos  90  04-30          Urinalysis Basic - ( 02 May 2024 07:29 )    Color: x / Appearance: x / SG: x / pH: x  Gluc: 218 mg/dL / Ketone: x  / Bili: x / Urobili: x   Blood: x / Protein: x / Nitrite: x   Leuk Esterase: x / RBC: x / WBC x   Sq Epi: x / Non Sq Epi: x / Bacteria: x        Culture - Blood (collected 30 Apr 2024 19:50)  Source: .Blood Blood-Peripheral  Preliminary Report (02 May 2024 03:03):    No growth at 24 hours    Culture - Blood (collected 30 Apr 2024 19:40)  Source: .Blood Blood-Peripheral  Preliminary Report (02 May 2024 03:03):    No growth at 24 hours      CAPILLARY BLOOD GLUCOSE      POCT Blood Glucose.: 222 mg/dL (02 May 2024 12:47)  POCT Blood Glucose.: 268 mg/dL (02 May 2024 08:14)  POCT Blood Glucose.: 170 mg/dL (01 May 2024 21:44)  POCT Blood Glucose.: 152 mg/dL (01 May 2024 17:58)        RADIOLOGY & ADDITIONAL TESTS:  Results Reviewed: Y  Imaging Personally Reviewed: N  Electrocardiogram Personally Reviewed: SUDARSHAN

## 2024-05-02 NOTE — PROGRESS NOTE ADULT - SUBJECTIVE AND OBJECTIVE BOX
57 y/o male with left diabetic foot infection, Uncontrolled DM-2, HTN for left lower extremity angiogram tomorrow. If there is a plan for intervention, please get cardiology eval with echocardiogram.    Bartolo Clark MD

## 2024-05-03 LAB
ANION GAP SERPL CALC-SCNC: 12 MMOL/L — SIGNIFICANT CHANGE UP (ref 5–17)
APTT BLD: 32.2 SEC — SIGNIFICANT CHANGE UP (ref 24.5–35.6)
BLD GP AB SCN SERPL QL: SIGNIFICANT CHANGE UP
BUN SERPL-MCNC: 19.3 MG/DL — SIGNIFICANT CHANGE UP (ref 8–20)
CALCIUM SERPL-MCNC: 8.7 MG/DL — SIGNIFICANT CHANGE UP (ref 8.4–10.5)
CHLORIDE SERPL-SCNC: 99 MMOL/L — SIGNIFICANT CHANGE UP (ref 96–108)
CO2 SERPL-SCNC: 26 MMOL/L — SIGNIFICANT CHANGE UP (ref 22–29)
CREAT SERPL-MCNC: 0.69 MG/DL — SIGNIFICANT CHANGE UP (ref 0.5–1.3)
EGFR: 106 ML/MIN/1.73M2 — SIGNIFICANT CHANGE UP
GLUCOSE BLDC GLUCOMTR-MCNC: 119 MG/DL — HIGH (ref 70–99)
GLUCOSE BLDC GLUCOMTR-MCNC: 120 MG/DL — HIGH (ref 70–99)
GLUCOSE BLDC GLUCOMTR-MCNC: 122 MG/DL — HIGH (ref 70–99)
GLUCOSE BLDC GLUCOMTR-MCNC: 192 MG/DL — HIGH (ref 70–99)
GLUCOSE SERPL-MCNC: 111 MG/DL — HIGH (ref 70–99)
HCT VFR BLD CALC: 35.3 % — LOW (ref 39–50)
HGB BLD-MCNC: 12.1 G/DL — LOW (ref 13–17)
INR BLD: 1.07 RATIO — SIGNIFICANT CHANGE UP (ref 0.85–1.18)
MAGNESIUM SERPL-MCNC: 2.3 MG/DL — SIGNIFICANT CHANGE UP (ref 1.6–2.6)
MCHC RBC-ENTMCNC: 30 PG — SIGNIFICANT CHANGE UP (ref 27–34)
MCHC RBC-ENTMCNC: 34.3 GM/DL — SIGNIFICANT CHANGE UP (ref 32–36)
MCV RBC AUTO: 87.6 FL — SIGNIFICANT CHANGE UP (ref 80–100)
MRSA PCR RESULT.: SIGNIFICANT CHANGE UP
PHOSPHATE SERPL-MCNC: 3.3 MG/DL — SIGNIFICANT CHANGE UP (ref 2.4–4.7)
PLATELET # BLD AUTO: 286 K/UL — SIGNIFICANT CHANGE UP (ref 150–400)
POTASSIUM SERPL-MCNC: 3.9 MMOL/L — SIGNIFICANT CHANGE UP (ref 3.5–5.3)
POTASSIUM SERPL-SCNC: 3.9 MMOL/L — SIGNIFICANT CHANGE UP (ref 3.5–5.3)
PROTHROM AB SERPL-ACNC: 11.9 SEC — SIGNIFICANT CHANGE UP (ref 9.5–13)
RBC # BLD: 4.03 M/UL — LOW (ref 4.2–5.8)
RBC # FLD: 11.3 % — SIGNIFICANT CHANGE UP (ref 10.3–14.5)
S AUREUS DNA NOSE QL NAA+PROBE: SIGNIFICANT CHANGE UP
SODIUM SERPL-SCNC: 137 MMOL/L — SIGNIFICANT CHANGE UP (ref 135–145)
WBC # BLD: 5.54 K/UL — SIGNIFICANT CHANGE UP (ref 3.8–10.5)
WBC # FLD AUTO: 5.54 K/UL — SIGNIFICANT CHANGE UP (ref 3.8–10.5)

## 2024-05-03 PROCEDURE — 99233 SBSQ HOSP IP/OBS HIGH 50: CPT

## 2024-05-03 PROCEDURE — 75710 ARTERY X-RAYS ARM/LEG: CPT | Mod: 26,GC,59

## 2024-05-03 PROCEDURE — 99231 SBSQ HOSP IP/OBS SF/LOW 25: CPT

## 2024-05-03 PROCEDURE — 37228: CPT | Mod: GC,LT

## 2024-05-03 PROCEDURE — 93010 ELECTROCARDIOGRAM REPORT: CPT

## 2024-05-03 RX ORDER — SODIUM CHLORIDE 9 MG/ML
250 INJECTION INTRAMUSCULAR; INTRAVENOUS; SUBCUTANEOUS ONCE
Refills: 0 | Status: DISCONTINUED | OUTPATIENT
Start: 2024-05-03 | End: 2024-05-06

## 2024-05-03 RX ORDER — ASPIRIN/CALCIUM CARB/MAGNESIUM 324 MG
81 TABLET ORAL DAILY
Refills: 0 | Status: DISCONTINUED | OUTPATIENT
Start: 2024-05-03 | End: 2024-05-06

## 2024-05-03 RX ORDER — CLOPIDOGREL BISULFATE 75 MG/1
300 TABLET, FILM COATED ORAL ONCE
Refills: 0 | Status: COMPLETED | OUTPATIENT
Start: 2024-05-03 | End: 2024-05-03

## 2024-05-03 RX ORDER — CLOPIDOGREL BISULFATE 75 MG/1
75 TABLET, FILM COATED ORAL DAILY
Refills: 0 | Status: DISCONTINUED | OUTPATIENT
Start: 2024-05-04 | End: 2024-05-06

## 2024-05-03 RX ORDER — ATORVASTATIN CALCIUM 80 MG/1
40 TABLET, FILM COATED ORAL AT BEDTIME
Refills: 0 | Status: DISCONTINUED | OUTPATIENT
Start: 2024-05-03 | End: 2024-05-06

## 2024-05-03 RX ADMIN — HEPARIN SODIUM 5000 UNIT(S): 5000 INJECTION INTRAVENOUS; SUBCUTANEOUS at 18:16

## 2024-05-03 RX ADMIN — LOSARTAN POTASSIUM 100 MILLIGRAM(S): 100 TABLET, FILM COATED ORAL at 05:45

## 2024-05-03 RX ADMIN — ATORVASTATIN CALCIUM 40 MILLIGRAM(S): 80 TABLET, FILM COATED ORAL at 22:07

## 2024-05-03 RX ADMIN — Medication 81 MILLIGRAM(S): at 16:28

## 2024-05-03 RX ADMIN — PIPERACILLIN AND TAZOBACTAM 25 GRAM(S): 4; .5 INJECTION, POWDER, LYOPHILIZED, FOR SOLUTION INTRAVENOUS at 18:12

## 2024-05-03 RX ADMIN — INSULIN GLARGINE 20 UNIT(S): 100 INJECTION, SOLUTION SUBCUTANEOUS at 22:08

## 2024-05-03 RX ADMIN — SODIUM CHLORIDE 3 MILLILITER(S): 9 INJECTION INTRAMUSCULAR; INTRAVENOUS; SUBCUTANEOUS at 05:39

## 2024-05-03 RX ADMIN — SODIUM CHLORIDE 3 MILLILITER(S): 9 INJECTION INTRAMUSCULAR; INTRAVENOUS; SUBCUTANEOUS at 18:12

## 2024-05-03 RX ADMIN — Medication 2: at 22:07

## 2024-05-03 RX ADMIN — CLOPIDOGREL BISULFATE 300 MILLIGRAM(S): 75 TABLET, FILM COATED ORAL at 16:28

## 2024-05-03 RX ADMIN — SODIUM CHLORIDE 3 MILLILITER(S): 9 INJECTION INTRAMUSCULAR; INTRAVENOUS; SUBCUTANEOUS at 22:05

## 2024-05-03 RX ADMIN — HEPARIN SODIUM 5000 UNIT(S): 5000 INJECTION INTRAVENOUS; SUBCUTANEOUS at 05:45

## 2024-05-03 RX ADMIN — PIPERACILLIN AND TAZOBACTAM 25 GRAM(S): 4; .5 INJECTION, POWDER, LYOPHILIZED, FOR SOLUTION INTRAVENOUS at 22:08

## 2024-05-03 RX ADMIN — PIPERACILLIN AND TAZOBACTAM 25 GRAM(S): 4; .5 INJECTION, POWDER, LYOPHILIZED, FOR SOLUTION INTRAVENOUS at 05:45

## 2024-05-03 NOTE — PROGRESS NOTE ADULT - SUBJECTIVE AND OBJECTIVE BOX
SUBJECTIVE / OVERNIGHT EVENTS: Seen and examined. Used  Pollo. Endorses feels well. NPO for angio today. Denies chest pain, SOB, abd pain, N/V, fever, chills, dysuria or any other complaints. All remainder ROS negative.     MEDICATIONS  (STANDING):  dextrose 10% Bolus 125 milliLiter(s) IV Bolus once  dextrose 5%. 1000 milliLiter(s) (50 mL/Hr) IV Continuous <Continuous>  dextrose 50% Injectable 25 Gram(s) IV Push once  glucagon  Injectable 1 milliGRAM(s) IntraMuscular once  heparin   Injectable 5000 Unit(s) SubCutaneous every 12 hours  influenza   Vaccine 0.5 milliLiter(s) IntraMuscular once  insulin glargine Injectable (LANTUS) 20 Unit(s) SubCutaneous at bedtime  insulin lispro (ADMELOG) corrective regimen sliding scale   SubCutaneous Before meals and at bedtime  insulin lispro Injectable (ADMELOG) 5 Unit(s) SubCutaneous before lunch  insulin lispro Injectable (ADMELOG) 5 Unit(s) SubCutaneous before dinner  insulin lispro Injectable (ADMELOG) 5 Unit(s) SubCutaneous before breakfast  losartan 100 milliGRAM(s) Oral daily  piperacillin/tazobactam IVPB.. 3.375 Gram(s) IV Intermittent every 8 hours  sodium chloride 0.9% lock flush 3 milliLiter(s) IV Push every 8 hours    MEDICATIONS  (PRN):  acetaminophen     Tablet .. 650 milliGRAM(s) Oral every 6 hours PRN Temp greater or equal to 38C (100.4F), Mild Pain (1 - 3)  aluminum hydroxide/magnesium hydroxide/simethicone Suspension 30 milliLiter(s) Oral every 4 hours PRN Dyspepsia  dextrose Oral Gel 15 Gram(s) Oral once PRN Blood Glucose LESS THAN 70 milliGRAM(s)/deciliter  melatonin 3 milliGRAM(s) Oral at bedtime PRN Insomnia  ondansetron Injectable 4 milliGRAM(s) IV Push every 8 hours PRN Nausea and/or Vomiting        I&O's Summary      PHYSICAL EXAM:  Vital Signs Last 24 Hrs  T(C): 36.7 (03 May 2024 10:40), Max: 37.6 (03 May 2024 05:24)  T(F): 98.1 (03 May 2024 10:40), Max: 99.7 (03 May 2024 05:24)  HR: 75 (03 May 2024 10:40) (75 - 85)  BP: 100/68 (03 May 2024 10:40) (100/68 - 164/83)  BP(mean): --  RR: 18 (03 May 2024 10:40) (18 - 18)  SpO2: 94% (03 May 2024 10:40) (94% - 99%)    Parameters below as of 02 May 2024 19:20  Patient On (Oxygen Delivery Method): room air        CONSTITUTIONAL: NAD  HEENMT: NC/AT, PERRLA, EOMI, Moist oral mucosa, no pharyngeal injection or exudates; normal dentition  RESPIRATORY: BLAE, No adventitious Lungs Sounds  CARDIOVASCULAR: S1/S2+, RRR, no MRG, No LE Edema  ABDOMEN: Soft, NT/ND, BS+, No guarding  MSK:  Moves limbs with purpose and direction; no clubbing or cyanosis of digits; no joint swelling or tenderness to palpation  PSYCH: normal mood and affect  NEUROLOGY: AAOx4, CN 2-12 are intact and symmetric; no gross sensory deficits;   SKIN: Warm, Dry, LLE Wound dorsum of left foot with large area of denuded skin, exudates/erythema, edema/warmth over    LABS:                        12.1   5.54  )-----------( 286      ( 03 May 2024 06:47 )             35.3     05-03    137  |  99  |  19.3  ----------------------------<  111<H>  3.9   |  26.0  |  0.69    Ca    8.7      03 May 2024 06:47  Phos  3.3     05-03  Mg     2.3     05-03      PT/INR - ( 03 May 2024 06:47 )   PT: 11.9 sec;   INR: 1.07 ratio         PTT - ( 03 May 2024 06:47 )  PTT:32.2 sec      Urinalysis Basic - ( 03 May 2024 06:47 )    Color: x / Appearance: x / SG: x / pH: x  Gluc: 111 mg/dL / Ketone: x  / Bili: x / Urobili: x   Blood: x / Protein: x / Nitrite: x   Leuk Esterase: x / RBC: x / WBC x   Sq Epi: x / Non Sq Epi: x / Bacteria: x        Culture - Blood (collected 30 Apr 2024 19:50)  Source: .Blood Blood-Peripheral  Preliminary Report (03 May 2024 03:02):    No growth at 48 Hours    Culture - Blood (collected 30 Apr 2024 19:40)  Source: .Blood Blood-Peripheral  Preliminary Report (03 May 2024 03:02):    No growth at 48 Hours      CAPILLARY BLOOD GLUCOSE      POCT Blood Glucose.: 122 mg/dL (03 May 2024 12:24)  POCT Blood Glucose.: 120 mg/dL (03 May 2024 11:55)  POCT Blood Glucose.: 119 mg/dL (03 May 2024 07:51)  POCT Blood Glucose.: 160 mg/dL (02 May 2024 21:07)  POCT Blood Glucose.: 205 mg/dL (02 May 2024 16:59)        RADIOLOGY & ADDITIONAL TESTS:  Results Reviewed:   Imaging Personally Reviewed:  Electrocardiogram Personally Reviewed:

## 2024-05-03 NOTE — CHART NOTE - NSCHARTNOTEFT_GEN_A_CORE
Chart Note entered incorrectly into another patient's chart. This chart note should be dated/timed for May 3rd, ~2130. For documentation purposes:    Post-op check:    Pt is a 75yo M s/p LLE angiogram with angioplasty of the TP trunk. Patient seen resting in bed. Pt denies pain at this time. Denies nausea, vomiting, SOB, chest pain. Tolerated food well after the procedure.     Vitals:  Vital Signs Last 24 Hrs  T(C): 36.6 (03 May 2024 21:22), Max: 37.1 (03 May 2024 04:08)  T(F): 97.9 (03 May 2024 21:22), Max: 98.7 (03 May 2024 04:08)  HR: 79 (03 May 2024 21:22) (72 - 89)  BP: 133/77 (03 May 2024 21:22) (120/69 - 139/80)  BP(mean): --  RR: 18 (03 May 2024 21:22) (16 - 25)  SpO2: 98% (03 May 2024 21:22) (91% - 100%)    Parameters below as of 03 May 2024 21:22  Patient On (Oxygen Delivery Method): room air    Physical exam:  General: alert, patient resting in bed comfortably, in NAD  Resp: equal chest rise bilaterally, nonlabored breathing  Cardio: RRR  Groin: soft, NT, ND, groin access site c/d/i with gauze and tegaderm in place  Extremities: moving all extremities spontaneously. Good doppler signals of the DP and PT bilaterally, unchanged from PACU. Dressing taken down, foot with improving cellulitis around large dorsal ulcer, eschar noted overlying, improved from picture daughter showed team. Motor and sensation intact in b/l feet.    Plan:   - Continue DAPT  - Pain control PRN  - Encourage OOB, ambulation as tolerated  - Continue IV abx, wound care as per podiatry  - DVT ppx: SQH.

## 2024-05-03 NOTE — PROGRESS NOTE ADULT - ASSESSMENT
Impression: 58 year old male with HTN and DM who presented for left LE non healing wound and presents for LLE angio.    Risk Assessments:  ASA: 3  Mallampati: 2  Bleeding Risk:0.9%  Creatinine: 0.69  GFR: 106      Plan:  -plan for LLE via RFA  -patient seen and examined  -confirmed appropriate NPO duration  -FLO prophylaxis NS 250cc bolus ordered  -ECG and Labs reviewed  -procedure discussed with patient; risks and benefits explained, questions answered  -consent obtained by attending IC Impression: 58 year old male with HTN and DM who presented for left LE non healing wound and presents for LLE angio.    Risk Assessments:  ASA: 3  Mallampati: 2  Bleeding Risk:0.9%  Creatinine: 0.69  GFR: 106      Plan:  -plan for LLE via RFA  -patient seen and examined  -confirmed appropriate NPO duration  -FLO prophylaxis NS 250cc bolus ordered  -ECG and Labs reviewed  -procedure discussed with patient; risks and benefits explained, questions answered  -consent obtained by attending IC    Post Cath:  Now s/p LLE angio via RFA with Dr. Vega tolerated procedure well. Pt arrived to recovery in NAD and HDS, RFA access site stable, no bleed/hematoma, distal pulse +,   Intraprocedurally: Versed 1 Fentayl 50ucg  Findings: TP disease s/p angioplasty  Post care/orders per Vascular team      Plan:  -Formal cath report pending  -Post procedure management/monitoring per protocol  -Access site precautions  -Bedrest 4 hours post procedure   -Repeat ECG if any clinical indication or change on tele  -Continue current medical therapy  -Dual anti platelet therapy with aspirin/plavix   -Educated regarding strict adherence with DAPT   -Educated regarding post procedure management and care  -Discussed the importance of RF modification  -Cardiac rehab info provided/referral and communication to cardiac rehab completed  -F/U outpt in 1-2 weeks with Cardiologist Dr. Wilson and Vascular Dr. Vega  -DISPO:  Plan per Vascular team

## 2024-05-03 NOTE — PROGRESS NOTE ADULT - ASSESSMENT
59 y/o male with left diabetic foot infection, Uncontrolled DM-2, HTN.     #Left diabetic foot infection:  -No sepsis criteria  -Exam as above  -No soft tissue air on xray   -No evidence of NF  -No hx of MRSA  -C/w zosyn  -Trend WBC/Temp  -local wound care  -Cardiology consult note appreciated  -Vascular for Angiogram today- NPO  -Podiatry - Local Wound Care  -DVT-P with ambulation/VC boots/Sub Q heparin    #Uncontrolled DM-2:  -A1c 14  -basal/bolus regimen w/ SS coverage  -ADA diet  -Cont. ARB  -hold metformin while admitted   -diabetic education    #HTN:  -DASH diet  -Cont. Losartan     Discussed with Patient at bedside.

## 2024-05-03 NOTE — PROGRESS NOTE ADULT - SUBJECTIVE AND OBJECTIVE BOX
[facility]                                   [address] [place] [address]                                                      PHONE: [phone]                                                         FAX: [phone]  [email or web address]  ---------------------------------------------------------------------------------------------------------------------------------    Overnight events/patient complaints:      NAD feeling well today     No Known Allergies    MEDICATIONS  (STANDING):  dextrose 10% Bolus 125 milliLiter(s) IV Bolus once  dextrose 5%. 1000 milliLiter(s) (50 mL/Hr) IV Continuous <Continuous>  dextrose 50% Injectable 25 Gram(s) IV Push once  glucagon  Injectable 1 milliGRAM(s) IntraMuscular once  heparin   Injectable 5000 Unit(s) SubCutaneous every 12 hours  influenza   Vaccine 0.5 milliLiter(s) IntraMuscular once  insulin glargine Injectable (LANTUS) 20 Unit(s) SubCutaneous at bedtime  insulin lispro (ADMELOG) corrective regimen sliding scale   SubCutaneous Before meals and at bedtime  insulin lispro Injectable (ADMELOG) 5 Unit(s) SubCutaneous before lunch  insulin lispro Injectable (ADMELOG) 5 Unit(s) SubCutaneous before dinner  insulin lispro Injectable (ADMELOG) 5 Unit(s) SubCutaneous before breakfast  losartan 100 milliGRAM(s) Oral daily  piperacillin/tazobactam IVPB.. 3.375 Gram(s) IV Intermittent every 8 hours  sodium chloride 0.9% lock flush 3 milliLiter(s) IV Push every 8 hours    MEDICATIONS  (PRN):  acetaminophen     Tablet .. 650 milliGRAM(s) Oral every 6 hours PRN Temp greater or equal to 38C (100.4F), Mild Pain (1 - 3)  aluminum hydroxide/magnesium hydroxide/simethicone Suspension 30 milliLiter(s) Oral every 4 hours PRN Dyspepsia  dextrose Oral Gel 15 Gram(s) Oral once PRN Blood Glucose LESS THAN 70 milliGRAM(s)/deciliter  melatonin 3 milliGRAM(s) Oral at bedtime PRN Insomnia  ondansetron Injectable 4 milliGRAM(s) IV Push every 8 hours PRN Nausea and/or Vomiting      Vital Signs Last 24 Hrs  T(C): 37.6 (03 May 2024 05:24), Max: 37.6 (03 May 2024 05:24)  T(F): 99.7 (03 May 2024 05:24), Max: 99.7 (03 May 2024 05:24)  HR: 75 (03 May 2024 05:24) (75 - 87)  BP: 164/79 (03 May 2024 05:24) (121/56 - 164/83)  BP(mean): --  RR: 18 (03 May 2024 05:24) (18 - 18)  SpO2: 99% (03 May 2024 05:24) (95% - 99%)    Parameters below as of 02 May 2024 19:20  Patient On (Oxygen Delivery Method): room air      ICU Vital Signs Last 24 Hrs  SARITHA SAMPSON  I&O's Detail    I&O's Summary    Drug Dosing Weight  SARITHAJERRI PETITLAKE      PHYSICAL EXAM:  General: Appears well developed, alert and cooperative.  HEENT: Head; normocephalic, atraumatic.  Eyes: Pupils reactive, cornea wnl.  Neck: Supple, no nodes adenopathy, no NVD or carotid bruit or thyromegaly.  CARDIOVASCULAR: Normal S1 and S2, No murmur, rub, gallop or lift.   LUNGS: No rales, rhonchi or wheeze. Normal breath sounds bilaterally.  ABDOMEN: Soft, nontender without mass or organomegaly. bowel sounds normoactive.  EXTREMITIES: No clubbing, cyanosis or edema. Distal pulses wnl.   SKIN: warm and dry with normal turgor.  NEURO: Alert/oriented x 3/normal motor exam. No pathologic reflexes.    PSYCH: normal affect.        LABS:                        12.1   5.54  )-----------( 286      ( 03 May 2024 06:47 )             35.3     05-03    137  |  99  |  19.3  ----------------------------<  111<H>  3.9   |  26.0  |  0.69    Ca    8.7      03 May 2024 06:47  Phos  3.3     05-03  Mg     2.3     05-03      SARITHA SAMPSON      PT/INR - ( 03 May 2024 06:47 )   PT: 11.9 sec;   INR: 1.07 ratio         PTT - ( 03 May 2024 06:47 )  PTT:32.2 sec  Urinalysis Basic - ( 03 May 2024 06:47 )    Color: x / Appearance: x / SG: x / pH: x  Gluc: 111 mg/dL / Ketone: x  / Bili: x / Urobili: x   Blood: x / Protein: x / Nitrite: x   Leuk Esterase: x / RBC: x / WBC x   Sq Epi: x / Non Sq Epi: x / Bacteria: x        RADIOLOGY & ADDITIONAL STUDIES:    INTERPRETATION OF TELEMETRY (personally reviewed):    < from: TTE W or WO Ultrasound Enhancing Agent (05.02.24 @ 15:56) >  _______________________________________________________________________________________     CONCLUSIONS:      1. Left ventricular cavity is normal in size. Left ventricular wall thickness is normal. Left ventricular systolic function is normal. There are no regional wall motion abnormalities seen.   2. Normal left ventricular diastolic function.   3. Normal right ventricular cavity size, with normal wall thickness, and normal systolic function.   4. The left atrium is normal in size.   5. The right atrium is normal in size.   6. Trileaflet aortic valve with normal systolic excursion.   7. Thickened mitral valve leaflets.   8. Mild mitral regurgitation.   9. Mild tricuspid regurgitation.  10. Estimated pulmonary artery systolic pressure is 16 mmHg, normal pulmonary arterypressure.  11. No prior echocardiogram is available for comparison.  12. No pericardial effusion seen.    < end of copied text >    58y Male with prior history of HTN, DM w/left foot traumatic non healing wound with associated cellulitis and DM. Plan for angiogram as per vascular surgery.    TTE stable see above       Pre-op cardiovascular evaluation for intermediate risk procedure  Patient planned intermediate risk angiogram procedure with moderate  perioperative risk without cardiac contraindications  - Close monitoring of BP and volume status  Will need outpt ischemic evaluation once infection and acute issues resolve    Please recall if needed

## 2024-05-03 NOTE — PROGRESS NOTE ADULT - SUBJECTIVE AND OBJECTIVE BOX
Department of Cardiology                                                                  [facility]/[place] [address] [address]                                                            Telephone: [phone]. Fax:[phone]                                                                             Pre- Procedure Progress Note      HPI:  64 y/o male with PMH HTN and DM admitted for LLE cellulitis and vascular surgery was consulted for the LLE wound. Patient was afebrile and hemodynamically stable this morning with 9x5cm left foot nonhealing wound with erythematous border and signal DP and PT.          Symptoms:        Angina (Class):        Ischemic Symptoms:     Heart Failure:        Systolic/Diastolic/Combined:        NYHA Class (within 2 weeks):     Assessment of LVEF (Must be within 6 months):       EF: Normal LVEF       Assessed by: TTE       Date: 5/2/24    Prior Cardiac Interventions: None       PCI's (Date, Stents, Vessels):        CABG (Date, Grafts):     Noninvasive Testing:   Stress Test: Date:        Protocol:        Duration of Exercise:        Symptoms:        EKG Changes:        DTS:        Myocardial Imaging:        Risk Assessment (Low, Medium, High):     Echo (Date, Findings): 5/2/24  CONCLUSIONS:      1. Left ventricular cavity is normal in size. Left ventricular wall thickness is normal. Left ventricular systolic function is normal. There are no regional wall motion abnormalities seen.   2. Normal left ventricular diastolic function.   3. Normal right ventricular cavity size, with normal wall thickness, and normal systolic function.   4. The left atrium is normal in size.   5. The right atrium is normal in size.   6. Trileaflet aortic valve with normal systolic excursion.   7. Thickened mitral valve leaflets.   8. Mild mitral regurgitation.   9. Mild tricuspid regurgitation.  10. Estimated pulmonary artery systolic pressure is 16 mmHg, normal pulmonary arterypressure.  11. No prior echocardiogram is available for comparison.  12. No pericardial effusion       Additional Diagnostics:        Associated Risk Factors:        Cerebrovascular Disease: N/A       Chronic Lung Disease: N/A       Peripheral Arterial Disease: N/A       Chronic Kidney Disease (if yes, what is GFR): N/A       Uncontrolled Diabetes (if yes, what is HgbA1C or FBS): N/A       Poorly Controlled Hypertension (if yes, what is SBP): N/A       Morbid Obesity (if yes, what is BMI): N/A       History of Recent Ventricular Arrhythmia: N/A       Inability to Ambulate Safely: N/A       Need for Therapeutic Anticoagulation: N/A       Antiplatelet or Contrast Allergy: N/A       Fraility: Mild/Moderate/Severe    Antianginal Therapies:        Beta Blockers:         Calcium Channel Blockers:        Long Acting Nitrates:        Ranexa:     	  MEDICATIONS:  losartan 100 milliGRAM(s) Oral daily    piperacillin/tazobactam IVPB.. 3.375 Gram(s) IV Intermittent every 8 hours      acetaminophen     Tablet .. 650 milliGRAM(s) Oral every 6 hours PRN  melatonin 3 milliGRAM(s) Oral at bedtime PRN  ondansetron Injectable 4 milliGRAM(s) IV Push every 8 hours PRN    aluminum hydroxide/magnesium hydroxide/simethicone Suspension 30 milliLiter(s) Oral every 4 hours PRN    dextrose 50% Injectable 25 Gram(s) IV Push once  dextrose Oral Gel 15 Gram(s) Oral once PRN  glucagon  Injectable 1 milliGRAM(s) IntraMuscular once  insulin glargine Injectable (LANTUS) 20 Unit(s) SubCutaneous at bedtime  insulin lispro (ADMELOG) corrective regimen sliding scale   SubCutaneous Before meals and at bedtime  insulin lispro Injectable (ADMELOG) 5 Unit(s) SubCutaneous before breakfast  insulin lispro Injectable (ADMELOG) 5 Unit(s) SubCutaneous before lunch  insulin lispro Injectable (ADMELOG) 5 Unit(s) SubCutaneous before dinner    dextrose 10% Bolus 125 milliLiter(s) IV Bolus once  dextrose 5%. 1000 milliLiter(s) IV Continuous <Continuous>  heparin   Injectable 5000 Unit(s) SubCutaneous every 12 hours  influenza   Vaccine 0.5 milliLiter(s) IntraMuscular once  sodium chloride 0.9% lock flush 3 milliLiter(s) IV Push every 8 hours        PHYSICAL EXAM:    Constitutional: A & O x 3  HEENT:   Normal oral mucosa, PERRL, EOMI	  Cardiovascular: Normal S1 S2, No JVD, No murmurs, No edema  Respiratory: Lungs clear to auscultation	  Gastrointestinal:  Soft, Non-tender, + BS	  Skin: No rashes, No ecchymoses, No cyanosis  Neurologic: Non-focal  Extremities: Normal range of motion, No clubbing, cyanosis or edema  Vascular: Peripheral pulses palpable 2+ bilaterally      T(C): 36.7 (05-03-24 @ 14:01), Max: 37.6 (05-03-24 @ 05:24)  HR: 85 (05-03-24 @ 14:01) (75 - 85)  BP: 187/98 (05-03-24 @ 14:01) (100/68 - 187/98)  RR: 16 (05-03-24 @ 14:01) (16 - 18)  SpO2: 97% (05-03-24 @ 14:01) (94% - 99%)  Wt(kg): --      I&O's Summary      Daily     Daily     TELEMETRY: 	      ECG:  	    LABS:	 	    CARDIAC MARKERS:                                  12.1   5.54  )-----------( 286      ( 03 May 2024 06:47 )             35.3     05-03    137  |  99  |  19.3  ----------------------------<  111<H>  3.9   |  26.0  |  0.69    Ca    8.7      03 May 2024 06:47  Phos  3.3     05-03  Mg     2.3     05-03      proBNP:   Lipid Profile:   HgA1c:   TSH:                                                                                      Department of Cardiology                                                                  [facility]/[place] [address] [address]                                                            Telephone: [phone]. Fax:[phone]                                                                             Pre- Procedure Progress Note      HPI:  62 y/o male with PMH HTN and DM admitted for LLE cellulitis and vascular surgery was consulted for the LLE wound. Patient was afebrile and hemodynamically stable this morning with 9x5cm left foot nonhealing wound with erythematous border and signal DP and PT.          Symptoms:        Angina (Class):        Ischemic Symptoms:     Heart Failure:        Systolic/Diastolic/Combined:        NYHA Class (within 2 weeks):     Assessment of LVEF (Must be within 6 months):       EF: Normal LVEF       Assessed by: TTE       Date: 5/2/24    Prior Cardiac Interventions: None       PCI's (Date, Stents, Vessels):        CABG (Date, Grafts):     Noninvasive Testing:   Stress Test: Date:        Protocol:        Duration of Exercise:        Symptoms:        EKG Changes:        DTS:        Myocardial Imaging:        Risk Assessment (Low, Medium, High):     Echo (Date, Findings): 5/2/24  CONCLUSIONS:      1. Left ventricular cavity is normal in size. Left ventricular wall thickness is normal. Left ventricular systolic function is normal. There are no regional wall motion abnormalities seen.   2. Normal left ventricular diastolic function.   3. Normal right ventricular cavity size, with normal wall thickness, and normal systolic function.   4. The left atrium is normal in size.   5. The right atrium is normal in size.   6. Trileaflet aortic valve with normal systolic excursion.   7. Thickened mitral valve leaflets.   8. Mild mitral regurgitation.   9. Mild tricuspid regurgitation.  10. Estimated pulmonary artery systolic pressure is 16 mmHg, normal pulmonary arterypressure.  11. No prior echocardiogram is available for comparison.  12. No pericardial effusion       Additional Diagnostics:        Associated Risk Factors:        Cerebrovascular Disease: N/A       Chronic Lung Disease: N/A       Peripheral Arterial Disease: N/A       Chronic Kidney Disease (if yes, what is GFR): N/A       Uncontrolled Diabetes (if yes, what is HgbA1C or FBS): N/A       Poorly Controlled Hypertension (if yes, what is SBP): N/A       Morbid Obesity (if yes, what is BMI): N/A       History of Recent Ventricular Arrhythmia: N/A       Inability to Ambulate Safely: N/A       Need for Therapeutic Anticoagulation: N/A       Antiplatelet or Contrast Allergy: N/A       Fraility: Mild/Moderate/Severe    Antianginal Therapies:        Beta Blockers:         Calcium Channel Blockers:        Long Acting Nitrates:        Ranexa:     	  MEDICATIONS:  losartan 100 milliGRAM(s) Oral daily  piperacillin/tazobactam IVPB.. 3.375 Gram(s) IV Intermittent every 8 hours  acetaminophen     Tablet .. 650 milliGRAM(s) Oral every 6 hours PRN  melatonin 3 milliGRAM(s) Oral at bedtime PRN  ondansetron Injectable 4 milliGRAM(s) IV Push every 8 hours PRN  aluminum hydroxide/magnesium hydroxide/simethicone Suspension 30 milliLiter(s) Oral every 4 hours PRN  dextrose 50% Injectable 25 Gram(s) IV Push once  dextrose Oral Gel 15 Gram(s) Oral once PRN  glucagon  Injectable 1 milliGRAM(s) IntraMuscular once  insulin glargine Injectable (LANTUS) 20 Unit(s) SubCutaneous at bedtime  insulin lispro (ADMELOG) corrective regimen sliding scale   SubCutaneous Before meals and at bedtime  insulin lispro Injectable (ADMELOG) 5 Unit(s) SubCutaneous before breakfast  insulin lispro Injectable (ADMELOG) 5 Unit(s) SubCutaneous before lunch  insulin lispro Injectable (ADMELOG) 5 Unit(s) SubCutaneous before dinner  dextrose 10% Bolus 125 milliLiter(s) IV Bolus once  dextrose 5%. 1000 milliLiter(s) IV Continuous <Continuous>  heparin   Injectable 5000 Unit(s) SubCutaneous every 12 hours  influenza   Vaccine 0.5 milliLiter(s) IntraMuscular once  sodium chloride 0.9% lock flush 3 milliLiter(s) IV Push every 8 hours        PHYSICAL EXAM:    Constitutional: A & O x 3, Mac[place] speaking  HEENT:   Normal oral mucosa, PERRL, EOMI	  Cardiovascular: Normal S1 S2, No JVD, No murmurs, No edema  Respiratory: Lungs clear to auscultation	  Gastrointestinal:  Soft, Non-tender, + BS	  Skin: No rashes, No ecchymoses, No cyanosis  Neurologic: Non-focal  Extremities: Normal range of motion, No clubbing, cyanosis or edema  Vascular: Left LE with dressing, RLE + DP      T(C): 36.7 (05-03-24 @ 14:01), Max: 37.6 (05-03-24 @ 05:24)  HR: 85 (05-03-24 @ 14:01) (75 - 85)  BP: 187/98 (05-03-24 @ 14:01) (100/68 - 187/98)  RR: 16 (05-03-24 @ 14:01) (16 - 18)  SpO2: 97% (05-03-24 @ 14:01) (94% - 99%)  Wt(kg): --      I&O's Summary      Daily     Daily     TELEMETRY: SR	      ECG:  	    LABS:	 	    CARDIAC MARKERS:                                  12.1   5.54  )-----------( 286      ( 03 May 2024 06:47 )             35.3     05-03    137  |  99  |  19.3  ----------------------------<  111<H>  3.9   |  26.0  |  0.69    Ca    8.7      03 May 2024 06:47  Phos  3.3     05-03  Mg     2.3     05-03      proBNP:   Lipid Profile:   HgA1c:   TSH:

## 2024-05-03 NOTE — BRIEF OPERATIVE NOTE - OPERATION/FINDINGS
LLE angiogram via R femoral artery access, 5Fr sheath. Stenosis of the TP trunk, PT artery stenosis with occlusion, AT artery stenosis with occlusion, patent peroneal artery. Distal reconstitution of the dorsalis pedis. Balloon angioplasty of the TP trunk with a 3mm balloon with improved patency of the TP trunk. Closure with Mynx closure device. Pressure held for 5 minutes, no bleeding noted at the end of the case.

## 2024-05-03 NOTE — BRIEF OPERATIVE NOTE - NSICDXBRIEFPROCEDURE_GEN_ALL_CORE_FT
PROCEDURES:  Angiogram, lower extremity, left 03-May-2024 16:02:25  Aurea Rod  Angioplasty, tibioperoneal trunk 03-May-2024 16:02:35  Aurea Rod

## 2024-05-03 NOTE — PROGRESS NOTE ADULT - ASSESSMENT
64 y/o male with PMH HTN and DM admitted for LLE cellulitis and vascular surgery was consulted for the LLE wound. Patient was afebrile and hemodynamically stable this morning with 9x5cm left foot wound with erythematous border and signal DP and PT. Patient is add on for cath lab for angiogram today. NPO since midnight.     PLAN  NPO since midnight  Angiogram today  Continue care per primary team

## 2024-05-03 NOTE — PROGRESS NOTE ADULT - SUBJECTIVE AND OBJECTIVE BOX
Patient seen and examined at bedside.     No acute events overnight. Patient had no complaints this morning. Denies worsening left leg pain, numbness or tingling.      Vitals:  Vital Signs Last 24 Hrs  T(C): 37.6 (03 May 2024 05:24), Max: 37.6 (03 May 2024 05:24)  T(F): 99.7 (03 May 2024 05:24), Max: 99.7 (03 May 2024 05:24)  HR: 75 (03 May 2024 05:24) (75 - 87)  BP: 164/79 (03 May 2024 05:24) (108/66 - 164/83)  BP(mean): --  RR: 18 (03 May 2024 05:24) (18 - 18)  SpO2: 99% (03 May 2024 05:24) (95% - 99%)    Parameters below as of 02 May 2024 19:20  Patient On (Oxygen Delivery Method): room air    Labs:  05-02    136  |  98  |  21.1<H>  ----------------------------<  218<H>  4.0   |  27.0  |  0.75    Ca    8.8      02 May 2024 07:29  Phos  3.1     05-02  Mg     2.2     05-02                          12.5   5.65  )-----------( 274      ( 02 May 2024 07:29 )             36.2       Exam:  Gen: pt lying in bed, alert, in NAD  Resp: unlabored  CVS: RRR  Abd: soft, NT, ND  LLE: 9 x 5cm wound with erythematous border. Warm to touch. Appropriately tender to palpation. L DP signal.   RLE: palpable DP and PT.

## 2024-05-03 NOTE — PROGRESS NOTE ADULT - ATTENDING COMMENTS
cath lab for LLE angiogram with possible intervention. NPO at midnight.
LLE angiogram with possible intervention today

## 2024-05-04 LAB
ALBUMIN SERPL ELPH-MCNC: 3.2 G/DL — LOW (ref 3.3–5.2)
ALP SERPL-CCNC: 63 U/L — SIGNIFICANT CHANGE UP (ref 40–120)
ALT FLD-CCNC: 7 U/L — SIGNIFICANT CHANGE UP
ANION GAP SERPL CALC-SCNC: 11 MMOL/L — SIGNIFICANT CHANGE UP (ref 5–17)
AST SERPL-CCNC: 11 U/L — SIGNIFICANT CHANGE UP
BILIRUB SERPL-MCNC: 0.3 MG/DL — LOW (ref 0.4–2)
BUN SERPL-MCNC: 17.5 MG/DL — SIGNIFICANT CHANGE UP (ref 8–20)
CALCIUM SERPL-MCNC: 8.6 MG/DL — SIGNIFICANT CHANGE UP (ref 8.4–10.5)
CHLORIDE SERPL-SCNC: 101 MMOL/L — SIGNIFICANT CHANGE UP (ref 96–108)
CO2 SERPL-SCNC: 25 MMOL/L — SIGNIFICANT CHANGE UP (ref 22–29)
CREAT SERPL-MCNC: 0.64 MG/DL — SIGNIFICANT CHANGE UP (ref 0.5–1.3)
EGFR: 108 ML/MIN/1.73M2 — SIGNIFICANT CHANGE UP
GLUCOSE BLDC GLUCOMTR-MCNC: 122 MG/DL — HIGH (ref 70–99)
GLUCOSE BLDC GLUCOMTR-MCNC: 208 MG/DL — HIGH (ref 70–99)
GLUCOSE BLDC GLUCOMTR-MCNC: 229 MG/DL — HIGH (ref 70–99)
GLUCOSE BLDC GLUCOMTR-MCNC: 311 MG/DL — HIGH (ref 70–99)
GLUCOSE SERPL-MCNC: 130 MG/DL — HIGH (ref 70–99)
HCT VFR BLD CALC: 34.3 % — LOW (ref 39–50)
HGB BLD-MCNC: 11.9 G/DL — LOW (ref 13–17)
MCHC RBC-ENTMCNC: 30.5 PG — SIGNIFICANT CHANGE UP (ref 27–34)
MCHC RBC-ENTMCNC: 34.7 GM/DL — SIGNIFICANT CHANGE UP (ref 32–36)
MCV RBC AUTO: 87.9 FL — SIGNIFICANT CHANGE UP (ref 80–100)
PLATELET # BLD AUTO: 302 K/UL — SIGNIFICANT CHANGE UP (ref 150–400)
POTASSIUM SERPL-MCNC: 4.1 MMOL/L — SIGNIFICANT CHANGE UP (ref 3.5–5.3)
POTASSIUM SERPL-SCNC: 4.1 MMOL/L — SIGNIFICANT CHANGE UP (ref 3.5–5.3)
PROT SERPL-MCNC: 6.3 G/DL — LOW (ref 6.6–8.7)
RBC # BLD: 3.9 M/UL — LOW (ref 4.2–5.8)
RBC # FLD: 11.2 % — SIGNIFICANT CHANGE UP (ref 10.3–14.5)
SODIUM SERPL-SCNC: 137 MMOL/L — SIGNIFICANT CHANGE UP (ref 135–145)
WBC # BLD: 4.81 K/UL — SIGNIFICANT CHANGE UP (ref 3.8–10.5)
WBC # FLD AUTO: 4.81 K/UL — SIGNIFICANT CHANGE UP (ref 3.8–10.5)

## 2024-05-04 PROCEDURE — 99233 SBSQ HOSP IP/OBS HIGH 50: CPT

## 2024-05-04 RX ADMIN — SODIUM CHLORIDE 3 MILLILITER(S): 9 INJECTION INTRAMUSCULAR; INTRAVENOUS; SUBCUTANEOUS at 21:12

## 2024-05-04 RX ADMIN — SODIUM CHLORIDE 3 MILLILITER(S): 9 INJECTION INTRAMUSCULAR; INTRAVENOUS; SUBCUTANEOUS at 05:22

## 2024-05-04 RX ADMIN — ATORVASTATIN CALCIUM 40 MILLIGRAM(S): 80 TABLET, FILM COATED ORAL at 21:07

## 2024-05-04 RX ADMIN — Medication 4: at 11:09

## 2024-05-04 RX ADMIN — Medication 8: at 16:06

## 2024-05-04 RX ADMIN — HEPARIN SODIUM 5000 UNIT(S): 5000 INJECTION INTRAVENOUS; SUBCUTANEOUS at 05:22

## 2024-05-04 RX ADMIN — HEPARIN SODIUM 5000 UNIT(S): 5000 INJECTION INTRAVENOUS; SUBCUTANEOUS at 17:03

## 2024-05-04 RX ADMIN — PIPERACILLIN AND TAZOBACTAM 25 GRAM(S): 4; .5 INJECTION, POWDER, LYOPHILIZED, FOR SOLUTION INTRAVENOUS at 21:07

## 2024-05-04 RX ADMIN — LOSARTAN POTASSIUM 100 MILLIGRAM(S): 100 TABLET, FILM COATED ORAL at 05:20

## 2024-05-04 RX ADMIN — Medication 81 MILLIGRAM(S): at 09:22

## 2024-05-04 RX ADMIN — Medication 5 UNIT(S): at 16:06

## 2024-05-04 RX ADMIN — Medication 4: at 21:12

## 2024-05-04 RX ADMIN — PIPERACILLIN AND TAZOBACTAM 25 GRAM(S): 4; .5 INJECTION, POWDER, LYOPHILIZED, FOR SOLUTION INTRAVENOUS at 05:20

## 2024-05-04 RX ADMIN — CLOPIDOGREL BISULFATE 75 MILLIGRAM(S): 75 TABLET, FILM COATED ORAL at 09:22

## 2024-05-04 RX ADMIN — Medication 5 UNIT(S): at 11:09

## 2024-05-04 RX ADMIN — INSULIN GLARGINE 20 UNIT(S): 100 INJECTION, SOLUTION SUBCUTANEOUS at 21:06

## 2024-05-04 RX ADMIN — PIPERACILLIN AND TAZOBACTAM 25 GRAM(S): 4; .5 INJECTION, POWDER, LYOPHILIZED, FOR SOLUTION INTRAVENOUS at 13:02

## 2024-05-04 RX ADMIN — Medication 5 UNIT(S): at 07:23

## 2024-05-04 RX ADMIN — SODIUM CHLORIDE 3 MILLILITER(S): 9 INJECTION INTRAMUSCULAR; INTRAVENOUS; SUBCUTANEOUS at 12:42

## 2024-05-04 NOTE — PROGRESS NOTE ADULT - SUBJECTIVE AND OBJECTIVE BOX
INTERVAL HPI/OVERNIGHT EVENTS:    CC: PAD, uncontrolled diabetes mellitus    Chart and course reviewed  s/p angio gram yesterday  denies complaints  no pain    Vital Signs Last 24 Hrs  T(C): 37 (04 May 2024 11:18), Max: 37 (04 May 2024 11:18)  T(F): 98.6 (04 May 2024 11:18), Max: 98.6 (04 May 2024 11:18)  HR: 77 (04 May 2024 11:18) (68 - 85)  BP: 138/73 (04 May 2024 11:18) (127/74 - 171/91)  BP(mean): --  RR: 18 (04 May 2024 11:18) (16 - 18)  SpO2: 99% (04 May 2024 11:18) (96% - 100%)    Parameters below as of 03 May 2024 16:25  Patient On (Oxygen Delivery Method): room air        PHYSICAL EXAM:    GENERAL: alert, not in distress  CHEST/LUNG: b/l air entry  HEART: reg  ABDOMEN: soft, bs+, non tender  EXTREMITIES:  left foot wound no drainage or bleeding noted, on dorsum of foot approx 5x4 cm    MEDICATIONS  (STANDING):  aspirin  chewable 81 milliGRAM(s) Oral daily  atorvastatin 40 milliGRAM(s) Oral at bedtime  clopidogrel Tablet 75 milliGRAM(s) Oral daily  dextrose 10% Bolus 125 milliLiter(s) IV Bolus once  dextrose 5%. 1000 milliLiter(s) (50 mL/Hr) IV Continuous <Continuous>  dextrose 50% Injectable 25 Gram(s) IV Push once  glucagon  Injectable 1 milliGRAM(s) IntraMuscular once  heparin   Injectable 5000 Unit(s) SubCutaneous every 12 hours  influenza   Vaccine 0.5 milliLiter(s) IntraMuscular once  insulin glargine Injectable (LANTUS) 20 Unit(s) SubCutaneous at bedtime  insulin lispro (ADMELOG) corrective regimen sliding scale   SubCutaneous Before meals and at bedtime  insulin lispro Injectable (ADMELOG) 5 Unit(s) SubCutaneous before lunch  insulin lispro Injectable (ADMELOG) 5 Unit(s) SubCutaneous before dinner  insulin lispro Injectable (ADMELOG) 5 Unit(s) SubCutaneous before breakfast  losartan 100 milliGRAM(s) Oral daily  piperacillin/tazobactam IVPB.. 3.375 Gram(s) IV Intermittent every 8 hours  sodium chloride 0.9% Bolus 250 milliLiter(s) IV Bolus once  sodium chloride 0.9% lock flush 3 milliLiter(s) IV Push every 8 hours    MEDICATIONS  (PRN):  acetaminophen     Tablet .. 650 milliGRAM(s) Oral every 6 hours PRN Temp greater or equal to 38C (100.4F), Mild Pain (1 - 3)  aluminum hydroxide/magnesium hydroxide/simethicone Suspension 30 milliLiter(s) Oral every 4 hours PRN Dyspepsia  dextrose Oral Gel 15 Gram(s) Oral once PRN Blood Glucose LESS THAN 70 milliGRAM(s)/deciliter  melatonin 3 milliGRAM(s) Oral at bedtime PRN Insomnia  ondansetron Injectable 4 milliGRAM(s) IV Push every 8 hours PRN Nausea and/or Vomiting      Allergies    No Known Allergies    Intolerances          LABS:                          11.9   4.81  )-----------( 302      ( 04 May 2024 05:30 )             34.3     05-04    137  |  101  |  17.5  ----------------------------<  130<H>  4.1   |  25.0  |  0.64    Ca    8.6      04 May 2024 05:30  Phos  3.3     05-03  Mg     2.3     05-03    TPro  6.3<L>  /  Alb  3.2<L>  /  TBili  0.3<L>  /  DBili  x   /  AST  11  /  ALT  7   /  AlkPhos  63  05-04    PT/INR - ( 03 May 2024 06:47 )   PT: 11.9 sec;   INR: 1.07 ratio         PTT - ( 03 May 2024 06:47 )  PTT:32.2 sec  Urinalysis Basic - ( 04 May 2024 05:30 )    Color: x / Appearance: x / SG: x / pH: x  Gluc: 130 mg/dL / Ketone: x  / Bili: x / Urobili: x   Blood: x / Protein: x / Nitrite: x   Leuk Esterase: x / RBC: x / WBC x   Sq Epi: x / Non Sq Epi: x / Bacteria: x        RADIOLOGY & ADDITIONAL TESTS:

## 2024-05-04 NOTE — PROGRESS NOTE ADULT - ASSESSMENT
62 yo M POD1 s/p LLE angiogram with balloon angioplasty of L TP trunk, doing well post-operatively with improved perfusion of LLE and no complaints.    Plan  - No further vascular intervention indicated  - Foot wound care per podiatry  - May ambulate as tolerated from a vascular perspective  - Outpatient follow up with Dr. Vega in 2 weeks  - Rest of care per primary team, vascular will follow peripherally while the pt is admitted

## 2024-05-04 NOTE — PROGRESS NOTE ADULT - SUBJECTIVE AND OBJECTIVE BOX
SURGERY      STATUS POST:  LLE angiogram with balloon angioplasty of L TP trunk    POST OPERATIVE DAY #1      INTERVAL EVENTS/SUBJECTIVE:   No acute events overnight.  Mr. Ball denies foot pain, numbness, or tingling.  He is able to move all of his toes without issue.    ______________________________________________  OBJECTIVE:   T(C): 37 (05-04-24 @ 11:18), Max: 37 (05-04-24 @ 11:18)  HR: 77 (05-04-24 @ 11:18) (68 - 85)  BP: 138/73 (05-04-24 @ 11:18) (127/74 - 187/98)  RR: 18 (05-04-24 @ 11:18) (16 - 18)  SpO2: 99% (05-04-24 @ 11:18) (96% - 100%)  Wt(kg): --  CAPILLARY BLOOD GLUCOSE      POCT Blood Glucose.: 208 mg/dL (04 May 2024 11:08)  POCT Blood Glucose.: 122 mg/dL (04 May 2024 07:22)  POCT Blood Glucose.: 192 mg/dL (03 May 2024 22:06)  POCT Blood Glucose.: 122 mg/dL (03 May 2024 12:24)  POCT Blood Glucose.: 120 mg/dL (03 May 2024 11:55)    I&O's Detail      Physical exam:  Gen: resting in bed comfortably in NAD  Chest: no increased WOB, regular inspiratory effort   LLE: warm and well perfused.  Able to move all toes.  Foot is bandaged by podiatry but PT signal is present on doppler.  R groin: R femoral incision site is c/d/i and soft without evidence of hematoma formation.  ______________________________________________  LABS:  CBC Full  -  ( 04 May 2024 05:30 )  WBC Count : 4.81 K/uL  RBC Count : 3.90 M/uL  Hemoglobin : 11.9 g/dL  Hematocrit : 34.3 %  Platelet Count - Automated : 302 K/uL  Mean Cell Volume : 87.9 fl  Mean Cell Hemoglobin : 30.5 pg  Mean Cell Hemoglobin Concentration : 34.7 gm/dL  Auto Neutrophil # : x  Auto Lymphocyte # : x  Auto Monocyte # : x  Auto Eosinophil # : x  Auto Basophil # : x  Auto Neutrophil % : x  Auto Lymphocyte % : x  Auto Monocyte % : x  Auto Eosinophil % : x  Auto Basophil % : x    05-04    137  |  101  |  17.5  ----------------------------<  130<H>  4.1   |  25.0  |  0.64    Ca    8.6      04 May 2024 05:30  Phos  3.3     05-03  Mg     2.3     05-03    TPro  6.3<L>  /  Alb  3.2<L>  /  TBili  0.3<L>  /  DBili  x   /  AST  11  /  ALT  7   /  AlkPhos  63  05-04

## 2024-05-04 NOTE — PROGRESS NOTE ADULT - ASSESSMENT
58 yr old male with hypertension, diabetes mellitus presented with complaints of 1 week of left foot swelling, redness with a wound on dorsum of left foot, skin sloughed off when he took his shoe and sock off. He was noted to large denuded area of skin with purulence with surrounding erythema, edema and warmth. Noted to have uncontrolled diabetes mellitus, A1C 14. Cultures were sent, Zosyn was initiated. Podiatry and vascular surgery was consulted, on exam, palpable femoral and popliteal pulses with non palpable pedal pulses. Plan for angiogram with possible intervention recommended by vascular surgery. Local wound care was recommended by podiatry. Blood cultures negative. TTE was done, normal LV function. Cardiology consulted, advised outpatient follow up. He underwent LLE angiogram with balloon angioplasty of L TP trunk on 5/3/24. Also given uncontrolled diabetes mellitus, he was given Lantus and pre meal insulin.     1. Left diabetic foot infection, underlying PAD:  Continue Zosyn  podiatry recommended local wound care  cultures negative so far.   S/p PCI, continue DAPT, Ator 40 mg QD  outpatient vascular surgery    2. Uncontrolled diabetes mellitus:  A1C 14  new to insulin administration  Continue Lantus 20 units QHS and pre meal 5 units  monitor FS  RN to teach to self administer insulni    3. Hypertension:  Continue Losartan 100 mg QD    4. DVT ppx:  Heparin    Discussed with patient and RN.

## 2024-05-05 LAB
ANION GAP SERPL CALC-SCNC: 9 MMOL/L — SIGNIFICANT CHANGE UP (ref 5–17)
BUN SERPL-MCNC: 23.7 MG/DL — HIGH (ref 8–20)
C PEPTIDE SERPL-MCNC: 3.6 NG/ML — SIGNIFICANT CHANGE UP (ref 1.1–4.4)
CALCIUM SERPL-MCNC: 8.7 MG/DL — SIGNIFICANT CHANGE UP (ref 8.4–10.5)
CHLORIDE SERPL-SCNC: 104 MMOL/L — SIGNIFICANT CHANGE UP (ref 96–108)
CO2 SERPL-SCNC: 26 MMOL/L — SIGNIFICANT CHANGE UP (ref 22–29)
CREAT SERPL-MCNC: 0.85 MG/DL — SIGNIFICANT CHANGE UP (ref 0.5–1.3)
EGFR: 99 ML/MIN/1.73M2 — SIGNIFICANT CHANGE UP
GLUCOSE BLDC GLUCOMTR-MCNC: 188 MG/DL — HIGH (ref 70–99)
GLUCOSE BLDC GLUCOMTR-MCNC: 253 MG/DL — HIGH (ref 70–99)
GLUCOSE BLDC GLUCOMTR-MCNC: 260 MG/DL — HIGH (ref 70–99)
GLUCOSE BLDC GLUCOMTR-MCNC: 282 MG/DL — HIGH (ref 70–99)
GLUCOSE SERPL-MCNC: 169 MG/DL — HIGH (ref 70–99)
MAGNESIUM SERPL-MCNC: 2.5 MG/DL — SIGNIFICANT CHANGE UP (ref 1.6–2.6)
POTASSIUM SERPL-MCNC: 4.5 MMOL/L — SIGNIFICANT CHANGE UP (ref 3.5–5.3)
POTASSIUM SERPL-SCNC: 4.5 MMOL/L — SIGNIFICANT CHANGE UP (ref 3.5–5.3)
SODIUM SERPL-SCNC: 139 MMOL/L — SIGNIFICANT CHANGE UP (ref 135–145)

## 2024-05-05 PROCEDURE — 99223 1ST HOSP IP/OBS HIGH 75: CPT

## 2024-05-05 PROCEDURE — 99233 SBSQ HOSP IP/OBS HIGH 50: CPT

## 2024-05-05 RX ORDER — INSULIN LISPRO 100/ML
10 VIAL (ML) SUBCUTANEOUS
Refills: 0 | Status: DISCONTINUED | OUTPATIENT
Start: 2024-05-05 | End: 2024-05-06

## 2024-05-05 RX ORDER — HYDRALAZINE HCL 50 MG
10 TABLET ORAL ONCE
Refills: 0 | Status: COMPLETED | OUTPATIENT
Start: 2024-05-05 | End: 2024-05-05

## 2024-05-05 RX ORDER — INSULIN LISPRO 100/ML
7 VIAL (ML) SUBCUTANEOUS
Refills: 0 | Status: DISCONTINUED | OUTPATIENT
Start: 2024-05-05 | End: 2024-05-05

## 2024-05-05 RX ORDER — INSULIN GLARGINE 100 [IU]/ML
24 INJECTION, SOLUTION SUBCUTANEOUS AT BEDTIME
Refills: 0 | Status: DISCONTINUED | OUTPATIENT
Start: 2024-05-05 | End: 2024-05-06

## 2024-05-05 RX ADMIN — ATORVASTATIN CALCIUM 40 MILLIGRAM(S): 80 TABLET, FILM COATED ORAL at 21:29

## 2024-05-05 RX ADMIN — SODIUM CHLORIDE 3 MILLILITER(S): 9 INJECTION INTRAMUSCULAR; INTRAVENOUS; SUBCUTANEOUS at 21:38

## 2024-05-05 RX ADMIN — Medication 6: at 21:32

## 2024-05-05 RX ADMIN — Medication 5 UNIT(S): at 12:04

## 2024-05-05 RX ADMIN — LOSARTAN POTASSIUM 100 MILLIGRAM(S): 100 TABLET, FILM COATED ORAL at 05:17

## 2024-05-05 RX ADMIN — Medication 81 MILLIGRAM(S): at 12:45

## 2024-05-05 RX ADMIN — Medication 2: at 07:51

## 2024-05-05 RX ADMIN — Medication 6: at 12:04

## 2024-05-05 RX ADMIN — HEPARIN SODIUM 5000 UNIT(S): 5000 INJECTION INTRAVENOUS; SUBCUTANEOUS at 18:10

## 2024-05-05 RX ADMIN — CLOPIDOGREL BISULFATE 75 MILLIGRAM(S): 75 TABLET, FILM COATED ORAL at 12:45

## 2024-05-05 RX ADMIN — PIPERACILLIN AND TAZOBACTAM 25 GRAM(S): 4; .5 INJECTION, POWDER, LYOPHILIZED, FOR SOLUTION INTRAVENOUS at 05:18

## 2024-05-05 RX ADMIN — SODIUM CHLORIDE 3 MILLILITER(S): 9 INJECTION INTRAMUSCULAR; INTRAVENOUS; SUBCUTANEOUS at 14:17

## 2024-05-05 RX ADMIN — PIPERACILLIN AND TAZOBACTAM 25 GRAM(S): 4; .5 INJECTION, POWDER, LYOPHILIZED, FOR SOLUTION INTRAVENOUS at 14:12

## 2024-05-05 RX ADMIN — HEPARIN SODIUM 5000 UNIT(S): 5000 INJECTION INTRAVENOUS; SUBCUTANEOUS at 05:18

## 2024-05-05 RX ADMIN — Medication 6: at 17:02

## 2024-05-05 RX ADMIN — Medication 5 UNIT(S): at 07:51

## 2024-05-05 RX ADMIN — PIPERACILLIN AND TAZOBACTAM 25 GRAM(S): 4; .5 INJECTION, POWDER, LYOPHILIZED, FOR SOLUTION INTRAVENOUS at 21:33

## 2024-05-05 RX ADMIN — SODIUM CHLORIDE 3 MILLILITER(S): 9 INJECTION INTRAMUSCULAR; INTRAVENOUS; SUBCUTANEOUS at 06:22

## 2024-05-05 RX ADMIN — Medication 10 UNIT(S): at 17:02

## 2024-05-05 RX ADMIN — INSULIN GLARGINE 24 UNIT(S): 100 INJECTION, SOLUTION SUBCUTANEOUS at 21:30

## 2024-05-05 RX ADMIN — Medication 10 MILLIGRAM(S): at 21:38

## 2024-05-05 NOTE — PROGRESS NOTE ADULT - SUBJECTIVE AND OBJECTIVE BOX
INTERVAL HPI/OVERNIGHT EVENTS:    CC: PAD, uncontrolled diabetes mellitus    No overnight events  explained need for insulin on discharge  RN teaching insulin administration  denies complaints.    Vital Signs Last 24 Hrs  T(C): 36.7 (05 May 2024 09:14), Max: 36.9 (04 May 2024 15:34)  T(F): 98.1 (05 May 2024 09:14), Max: 98.5 (05 May 2024 05:42)  HR: 83 (05 May 2024 09:14) (73 - 87)  BP: 101/68 (05 May 2024 09:14) (101/68 - 178/88)  BP(mean): --  RR: 18 (05 May 2024 09:14) (18 - 18)  SpO2: 99% (05 May 2024 09:14) (97% - 99%)    Parameters below as of 05 May 2024 09:14  Patient On (Oxygen Delivery Method): room air        PHYSICAL EXAM:    GENERAL: alert, not in distress  CHEST/LUNG: b/l air entry  HEART: reg  ABDOMEN: soft, bs+  EXTREMITIES:  dressing over left foot, no edema, tenderness    MEDICATIONS  (STANDING):  aspirin  chewable 81 milliGRAM(s) Oral daily  atorvastatin 40 milliGRAM(s) Oral at bedtime  clopidogrel Tablet 75 milliGRAM(s) Oral daily  dextrose 10% Bolus 125 milliLiter(s) IV Bolus once  dextrose 5%. 1000 milliLiter(s) (50 mL/Hr) IV Continuous <Continuous>  dextrose 50% Injectable 25 Gram(s) IV Push once  glucagon  Injectable 1 milliGRAM(s) IntraMuscular once  heparin   Injectable 5000 Unit(s) SubCutaneous every 12 hours  influenza   Vaccine 0.5 milliLiter(s) IntraMuscular once  insulin glargine Injectable (LANTUS) 20 Unit(s) SubCutaneous at bedtime  insulin lispro (ADMELOG) corrective regimen sliding scale   SubCutaneous Before meals and at bedtime  insulin lispro Injectable (ADMELOG) 7 Unit(s) SubCutaneous before dinner  losartan 100 milliGRAM(s) Oral daily  piperacillin/tazobactam IVPB.. 3.375 Gram(s) IV Intermittent every 8 hours  sodium chloride 0.9% Bolus 250 milliLiter(s) IV Bolus once  sodium chloride 0.9% lock flush 3 milliLiter(s) IV Push every 8 hours    MEDICATIONS  (PRN):  acetaminophen     Tablet .. 650 milliGRAM(s) Oral every 6 hours PRN Temp greater or equal to 38C (100.4F), Mild Pain (1 - 3)  aluminum hydroxide/magnesium hydroxide/simethicone Suspension 30 milliLiter(s) Oral every 4 hours PRN Dyspepsia  dextrose Oral Gel 15 Gram(s) Oral once PRN Blood Glucose LESS THAN 70 milliGRAM(s)/deciliter  melatonin 3 milliGRAM(s) Oral at bedtime PRN Insomnia  ondansetron Injectable 4 milliGRAM(s) IV Push every 8 hours PRN Nausea and/or Vomiting      Allergies    No Known Allergies    Intolerances          LABS:                          11.9   4.81  )-----------( 302      ( 04 May 2024 05:30 )             34.3     05-05    139  |  104  |  23.7<H>  ----------------------------<  169<H>  4.5   |  26.0  |  0.85    Ca    8.7      05 May 2024 05:11  Mg     2.5     05-05    TPro  6.3<L>  /  Alb  3.2<L>  /  TBili  0.3<L>  /  DBili  x   /  AST  11  /  ALT  7   /  AlkPhos  63  05-04      Urinalysis Basic - ( 05 May 2024 05:11 )    Color: x / Appearance: x / SG: x / pH: x  Gluc: 169 mg/dL / Ketone: x  / Bili: x / Urobili: x   Blood: x / Protein: x / Nitrite: x   Leuk Esterase: x / RBC: x / WBC x   Sq Epi: x / Non Sq Epi: x / Bacteria: x        RADIOLOGY & ADDITIONAL TESTS:

## 2024-05-05 NOTE — CONSULT NOTE ADULT - SUBJECTIVE AND OBJECTIVE BOX
Patient is a 58y old  Male who presents with a chief complaint of Left foot Cellulitis (05 May 2024 12:26)    HPI:  58M Nepali speaking, with hx of HTN, T2DM presents to ED c/o 1 week of left foot swelling, redness, and large wound to top of foot. in the ER vitals stable, left foot with large 8x6cm area of denuded skin with fibrinous exudates purulence with surrounding erythema, edema, and warmth. Labs with glucose of 554, A1c of 14. normal AG/HCO3  5/3 s/p balloon angioplasty of LLE  consult for diabetes mgmt, a1c 14          PAST MEDICAL & SURGICAL HISTORY:  HTN (hypertension)    Diabetes    No significant past surgical history        Social History:  see above    FAMILY HISTORY:  FH: diabetes mellitus          Allergies    No Known Allergies    Intolerances        ROS as noted in the HPI    MEDICATIONS  (STANDING):  aspirin  chewable 81 milliGRAM(s) Oral daily  atorvastatin 40 milliGRAM(s) Oral at bedtime  clopidogrel Tablet 75 milliGRAM(s) Oral daily  dextrose 10% Bolus 125 milliLiter(s) IV Bolus once  dextrose 5%. 1000 milliLiter(s) (50 mL/Hr) IV Continuous <Continuous>  dextrose 50% Injectable 25 Gram(s) IV Push once  glucagon  Injectable 1 milliGRAM(s) IntraMuscular once  heparin   Injectable 5000 Unit(s) SubCutaneous every 12 hours  influenza   Vaccine 0.5 milliLiter(s) IntraMuscular once  insulin glargine Injectable (LANTUS) 20 Unit(s) SubCutaneous at bedtime  insulin lispro (ADMELOG) corrective regimen sliding scale   SubCutaneous Before meals and at bedtime  insulin lispro Injectable (ADMELOG) 7 Unit(s) SubCutaneous before dinner  losartan 100 milliGRAM(s) Oral daily  piperacillin/tazobactam IVPB.. 3.375 Gram(s) IV Intermittent every 8 hours  sodium chloride 0.9% Bolus 250 milliLiter(s) IV Bolus once  sodium chloride 0.9% lock flush 3 milliLiter(s) IV Push every 8 hours    MEDICATIONS  (PRN):  acetaminophen     Tablet .. 650 milliGRAM(s) Oral every 6 hours PRN Temp greater or equal to 38C (100.4F), Mild Pain (1 - 3)  aluminum hydroxide/magnesium hydroxide/simethicone Suspension 30 milliLiter(s) Oral every 4 hours PRN Dyspepsia  dextrose Oral Gel 15 Gram(s) Oral once PRN Blood Glucose LESS THAN 70 milliGRAM(s)/deciliter  melatonin 3 milliGRAM(s) Oral at bedtime PRN Insomnia  ondansetron Injectable 4 milliGRAM(s) IV Push every 8 hours PRN Nausea and/or Vomiting      Vital Signs Last 24 Hrs  T(C): 36.7 (05 May 2024 09:14), Max: 36.9 (04 May 2024 15:34)  T(F): 98.1 (05 May 2024 09:14), Max: 98.5 (05 May 2024 05:42)  HR: 83 (05 May 2024 09:14) (73 - 87)  BP: 101/68 (05 May 2024 09:14) (101/68 - 178/88)  BP(mean): --  RR: 18 (05 May 2024 09:14) (18 - 18)  SpO2: 99% (05 May 2024 09:14) (97% - 99%)    Parameters below as of 05 May 2024 09:14  Patient On (Oxygen Delivery Method): room air          Physical Exam:    Constitutional: NAD, well-developed  HEENT: EOMI, no exophalmos  Neck: trachea midline, no thyroid enlargement  Respiratory: CTAB, normal respirations  Cardiovascular: S1 and S2, RRR  Gastrointestinal: BS+, soft, ntnd  Extremities: No peripheral edema  Neurological: AOx3, no focal deficits  Psychiatric: Normal mood and normal affect  Skin: no rashes, no acanthosis    LABS  05-05    139  |  104  |  23.7<H>  ----------------------------<  169<H>  4.5   |  26.0  |  0.85    Ca    8.7      05 May 2024 05:11  Mg     2.5     05-05    TPro  6.3<L>  /  Alb  3.2<L>  /  TBili  0.3<L>  /  DBili  x   /  AST  11  /  ALT  7   /  AlkPhos  63  05-04                          11.9   4.81  )-----------( 302      ( 04 May 2024 05:30 )             34.3       A1C with Estimated Average Glucose Result: 14.1 % (04-30-24 @ 18:34)          Alkaline Phosphatase: 63 U/L (05-04-24 @ 05:30)  Albumin: 3.2 g/dL (05-04-24 @ 05:30)  Aspartate Aminotransferase (AST/SGOT): 11 U/L (05-04-24 @ 05:30)  Alanine Aminotransferase (ALT/SGPT): 7 U/L (05-04-24 @ 05:30)          CAPILLARY BLOOD GLUCOSE      POCT Blood Glucose.: 260 mg/dL (05 May 2024 11:58)  POCT Blood Glucose.: 188 mg/dL (05 May 2024 07:37)  POCT Blood Glucose.: 229 mg/dL (04 May 2024 20:48)  POCT Blood Glucose.: 311 mg/dL (04 May 2024 16:05)   Patient is a 58y old  Male who presents with a chief complaint of Left foot Cellulitis (05 May 2024 12:26)    HPI:  58M Latvian speaking, with hx of HTN, T2DM presents to ED c/o 1 week of left foot swelling, redness, and large wound to top of foot. in the ER vitals stable, left foot with large 8x6cm area of denuded skin with fibrinous exudates purulence with surrounding erythema, edema, and warmth. Labs with glucose of 554, A1c of 14. normal AG/HCO3  5/3 s/p balloon angioplasty of LLE  consult for diabetes mgmt, a1c 14    interpeter [id number] Nasrin  has diabetes for 12 years  diabetes in mother  takes metformin 1000 BID  checks sugars but reports that it is high depending on what he eats  no smoking/etoh  not working currently  lives with family        PAST MEDICAL & SURGICAL HISTORY:  HTN (hypertension)    Diabetes    No significant past surgical history        Social History:  see above    FAMILY HISTORY:  FH: diabetes mellitus          Allergies    No Known Allergies    Intolerances        ROS as noted in the HPI    MEDICATIONS  (STANDING):  aspirin  chewable 81 milliGRAM(s) Oral daily  atorvastatin 40 milliGRAM(s) Oral at bedtime  clopidogrel Tablet 75 milliGRAM(s) Oral daily  dextrose 10% Bolus 125 milliLiter(s) IV Bolus once  dextrose 5%. 1000 milliLiter(s) (50 mL/Hr) IV Continuous <Continuous>  dextrose 50% Injectable 25 Gram(s) IV Push once  glucagon  Injectable 1 milliGRAM(s) IntraMuscular once  heparin   Injectable 5000 Unit(s) SubCutaneous every 12 hours  influenza   Vaccine 0.5 milliLiter(s) IntraMuscular once  insulin glargine Injectable (LANTUS) 20 Unit(s) SubCutaneous at bedtime  insulin lispro (ADMELOG) corrective regimen sliding scale   SubCutaneous Before meals and at bedtime  insulin lispro Injectable (ADMELOG) 7 Unit(s) SubCutaneous before dinner  losartan 100 milliGRAM(s) Oral daily  piperacillin/tazobactam IVPB.. 3.375 Gram(s) IV Intermittent every 8 hours  sodium chloride 0.9% Bolus 250 milliLiter(s) IV Bolus once  sodium chloride 0.9% lock flush 3 milliLiter(s) IV Push every 8 hours    MEDICATIONS  (PRN):  acetaminophen     Tablet .. 650 milliGRAM(s) Oral every 6 hours PRN Temp greater or equal to 38C (100.4F), Mild Pain (1 - 3)  aluminum hydroxide/magnesium hydroxide/simethicone Suspension 30 milliLiter(s) Oral every 4 hours PRN Dyspepsia  dextrose Oral Gel 15 Gram(s) Oral once PRN Blood Glucose LESS THAN 70 milliGRAM(s)/deciliter  melatonin 3 milliGRAM(s) Oral at bedtime PRN Insomnia  ondansetron Injectable 4 milliGRAM(s) IV Push every 8 hours PRN Nausea and/or Vomiting      Vital Signs Last 24 Hrs  T(C): 36.7 (05 May 2024 09:14), Max: 36.9 (04 May 2024 15:34)  T(F): 98.1 (05 May 2024 09:14), Max: 98.5 (05 May 2024 05:42)  HR: 83 (05 May 2024 09:14) (73 - 87)  BP: 101/68 (05 May 2024 09:14) (101/68 - 178/88)  BP(mean): --  RR: 18 (05 May 2024 09:14) (18 - 18)  SpO2: 99% (05 May 2024 09:14) (97% - 99%)    Parameters below as of 05 May 2024 09:14  Patient On (Oxygen Delivery Method): room air          Physical Exam:    Constitutional: NAD, well-developed, thin  Neck: trachea midline, no thyroid enlargement  Respiratory: CTAB, normal respirations  Cardiovascular: S1 and S2, RRR  Gastrointestinal: BS+, soft, ntnd  Extremities: No peripheral edema, left foot bandaged  Neurological: AOx3, no focal deficits  Psychiatric: Normal mood and normal affect  Skin: no rashes, no acanthosis    LABS  05-05    139  |  104  |  23.7<H>  ----------------------------<  169<H>  4.5   |  26.0  |  0.85    Ca    8.7      05 May 2024 05:11  Mg     2.5     05-05    TPro  6.3<L>  /  Alb  3.2<L>  /  TBili  0.3<L>  /  DBili  x   /  AST  11  /  ALT  7   /  AlkPhos  63  05-04                          11.9   4.81  )-----------( 302      ( 04 May 2024 05:30 )             34.3       A1C with Estimated Average Glucose Result: 14.1 % (04-30-24 @ 18:34)          Alkaline Phosphatase: 63 U/L (05-04-24 @ 05:30)  Albumin: 3.2 g/dL (05-04-24 @ 05:30)  Aspartate Aminotransferase (AST/SGOT): 11 U/L (05-04-24 @ 05:30)  Alanine Aminotransferase (ALT/SGPT): 7 U/L (05-04-24 @ 05:30)          CAPILLARY BLOOD GLUCOSE      POCT Blood Glucose.: 260 mg/dL (05 May 2024 11:58)  POCT Blood Glucose.: 188 mg/dL (05 May 2024 07:37)  POCT Blood Glucose.: 229 mg/dL (04 May 2024 20:48)  POCT Blood Glucose.: 311 mg/dL (04 May 2024 16:05)

## 2024-05-05 NOTE — CONSULT NOTE ADULT - ASSESSMENT
58M Frisian speaking, with hx of HTN, T2DM presents to ED c/o 1 week of left foot swelling, redness, and large wound to top of foot. in the ER vitals stable, left foot with large 8x6cm area of denuded skin with fibrinous exudates purulence with surrounding erythema, edema, and warmth. Labs with glucose of 554, A1c of 14. normal AG/HCO3  5/3 s/p balloon angioplasty of LLE  consult for diabetes mgmt, a1c 14      Uncontrolled T2DM- hyperglycemic  -A1c 14  -check sugars AC and bedtime  -ensure diabetic diet  -change to lantus 24 units QHS  -change to admelog 10 units TID  -continue with insulin sliding scale  -5/2 seen by cde  -will need insulin with discharge  -should follow up with us in the clinic    L foot wound- on abx, care per primary team    HLD- continue statin   58M Hebrew speaking, with hx of HTN, T2DM presents to ED c/o 1 week of left foot swelling, redness, and large wound to top of foot. in the ER vitals stable, left foot with large 8x6cm area of denuded skin with fibrinous exudates purulence with surrounding erythema, edema, and warmth. Labs with glucose of 554, A1c of 14. normal AG/HCO3  5/3 s/p balloon angioplasty of LLE  consult for diabetes mgmt, a1c 14      Uncontrolled T2DM- hyperglycemic  -A1c 14  -check sugars AC and bedtime  -ensure diabetic diet  -change to lantus 24 units QHS  -change to admelog 10 units TID  -continue with insulin sliding scale  -5/2 seen by cde  -will need insulin with discharge, discussed with the patient  -should follow up with us in the clinic  -bc the patient is thin, will check cpeptide/randa to r/o type 1    L foot wound- on abx, care per primary team    HLD- continue statin

## 2024-05-05 NOTE — PROGRESS NOTE ADULT - ASSESSMENT
58 yr old male with hypertension, diabetes mellitus presented with complaints of 1 week of left foot swelling, redness with a wound on dorsum of left foot, skin sloughed off when he took his shoe and sock off. He was noted to large denuded area of skin with purulence with surrounding erythema, edema and warmth. Noted to have uncontrolled diabetes mellitus, A1C 14. Cultures were sent, Zosyn was initiated. Podiatry and vascular surgery was consulted, on exam, palpable femoral and popliteal pulses with non palpable pedal pulses. Plan for angiogram with possible intervention recommended by vascular surgery. Local wound care was recommended by podiatry. Blood cultures negative. TTE was done, normal LV function. Cardiology consulted, advised outpatient follow up. He underwent LLE angiogram with balloon angioplasty of L TP trunk on 5/3/24. Also given uncontrolled diabetes mellitus, he was given Lantus and pre meal insulin.     1. Left diabetic foot infection, underlying PAD:  Continue Zosyn, will transition to Augmentin on discharge, to complete 10 days.  podiatry recommended local wound care  cultures negative so far.   S/p PCI, continue DAPT, Ator 40 mg QD  outpatient vascular surgery    2. Uncontrolled diabetes mellitus:  A1C 14  new to insulin administration  Continue Lantus 20 units QHS and increase pre meal to 7 units.  monitor FS  RN to teach to self administer insulin  endocrine eval and close outpatient follow up    3. Hypertension:  Continue Losartan 100 mg QD    4. DVT ppx:  Heparin    Discussed with patient and RN.  Anticipate discharge in 24 hrs.

## 2024-05-06 ENCOUNTER — TRANSCRIPTION ENCOUNTER (OUTPATIENT)
Age: 59
End: 2024-05-06

## 2024-05-06 VITALS
TEMPERATURE: 98 F | HEART RATE: 88 BPM | DIASTOLIC BLOOD PRESSURE: 70 MMHG | SYSTOLIC BLOOD PRESSURE: 124 MMHG | OXYGEN SATURATION: 98 % | RESPIRATION RATE: 18 BRPM

## 2024-05-06 LAB
ALBUMIN SERPL ELPH-MCNC: 3 G/DL — LOW (ref 3.3–5.2)
ALP SERPL-CCNC: 60 U/L — SIGNIFICANT CHANGE UP (ref 40–120)
ALT FLD-CCNC: 46 U/L — HIGH
ANION GAP SERPL CALC-SCNC: 11 MMOL/L — SIGNIFICANT CHANGE UP (ref 5–17)
AST SERPL-CCNC: 60 U/L — HIGH
BILIRUB SERPL-MCNC: 0.2 MG/DL — LOW (ref 0.4–2)
BUN SERPL-MCNC: 19.7 MG/DL — SIGNIFICANT CHANGE UP (ref 8–20)
CALCIUM SERPL-MCNC: 8.5 MG/DL — SIGNIFICANT CHANGE UP (ref 8.4–10.5)
CHLORIDE SERPL-SCNC: 103 MMOL/L — SIGNIFICANT CHANGE UP (ref 96–108)
CO2 SERPL-SCNC: 24 MMOL/L — SIGNIFICANT CHANGE UP (ref 22–29)
CREAT SERPL-MCNC: 0.72 MG/DL — SIGNIFICANT CHANGE UP (ref 0.5–1.3)
CULTURE RESULTS: SIGNIFICANT CHANGE UP
CULTURE RESULTS: SIGNIFICANT CHANGE UP
EGFR: 105 ML/MIN/1.73M2 — SIGNIFICANT CHANGE UP
GLUCOSE BLDC GLUCOMTR-MCNC: 203 MG/DL — HIGH (ref 70–99)
GLUCOSE BLDC GLUCOMTR-MCNC: 253 MG/DL — HIGH (ref 70–99)
GLUCOSE SERPL-MCNC: 170 MG/DL — HIGH (ref 70–99)
POTASSIUM SERPL-MCNC: 4.3 MMOL/L — SIGNIFICANT CHANGE UP (ref 3.5–5.3)
POTASSIUM SERPL-SCNC: 4.3 MMOL/L — SIGNIFICANT CHANGE UP (ref 3.5–5.3)
PROT SERPL-MCNC: 6.3 G/DL — LOW (ref 6.6–8.7)
SODIUM SERPL-SCNC: 138 MMOL/L — SIGNIFICANT CHANGE UP (ref 135–145)
SPECIMEN SOURCE: SIGNIFICANT CHANGE UP
SPECIMEN SOURCE: SIGNIFICANT CHANGE UP

## 2024-05-06 PROCEDURE — 87641 MR-STAPH DNA AMP PROBE: CPT

## 2024-05-06 PROCEDURE — 37228: CPT

## 2024-05-06 PROCEDURE — 80053 COMPREHEN METABOLIC PANEL: CPT

## 2024-05-06 PROCEDURE — 96361 HYDRATE IV INFUSION ADD-ON: CPT

## 2024-05-06 PROCEDURE — 84100 ASSAY OF PHOSPHORUS: CPT

## 2024-05-06 PROCEDURE — C1760: CPT

## 2024-05-06 PROCEDURE — 82962 GLUCOSE BLOOD TEST: CPT

## 2024-05-06 PROCEDURE — 86901 BLOOD TYPING SEROLOGIC RH(D): CPT

## 2024-05-06 PROCEDURE — 86850 RBC ANTIBODY SCREEN: CPT

## 2024-05-06 PROCEDURE — 99239 HOSP IP/OBS DSCHRG MGMT >30: CPT

## 2024-05-06 PROCEDURE — 83735 ASSAY OF MAGNESIUM: CPT

## 2024-05-06 PROCEDURE — 87040 BLOOD CULTURE FOR BACTERIA: CPT

## 2024-05-06 PROCEDURE — 36415 COLL VENOUS BLD VENIPUNCTURE: CPT

## 2024-05-06 PROCEDURE — 86140 C-REACTIVE PROTEIN: CPT

## 2024-05-06 PROCEDURE — 85027 COMPLETE CBC AUTOMATED: CPT

## 2024-05-06 PROCEDURE — C1725: CPT

## 2024-05-06 PROCEDURE — 99285 EMERGENCY DEPT VISIT HI MDM: CPT

## 2024-05-06 PROCEDURE — 87640 STAPH A DNA AMP PROBE: CPT

## 2024-05-06 PROCEDURE — C1769: CPT

## 2024-05-06 PROCEDURE — 85730 THROMBOPLASTIN TIME PARTIAL: CPT

## 2024-05-06 PROCEDURE — 93005 ELECTROCARDIOGRAM TRACING: CPT

## 2024-05-06 PROCEDURE — 86341 ISLET CELL ANTIBODY: CPT

## 2024-05-06 PROCEDURE — 99232 SBSQ HOSP IP/OBS MODERATE 35: CPT

## 2024-05-06 PROCEDURE — 96374 THER/PROPH/DIAG INJ IV PUSH: CPT

## 2024-05-06 PROCEDURE — 85025 COMPLETE CBC W/AUTO DIFF WBC: CPT

## 2024-05-06 PROCEDURE — 85652 RBC SED RATE AUTOMATED: CPT

## 2024-05-06 PROCEDURE — 84681 ASSAY OF C-PEPTIDE: CPT

## 2024-05-06 PROCEDURE — 75716 ARTERY X-RAYS ARMS/LEGS: CPT | Mod: 59

## 2024-05-06 PROCEDURE — 93306 TTE W/DOPPLER COMPLETE: CPT

## 2024-05-06 PROCEDURE — 86900 BLOOD TYPING SEROLOGIC ABO: CPT

## 2024-05-06 PROCEDURE — 73630 X-RAY EXAM OF FOOT: CPT

## 2024-05-06 PROCEDURE — 80048 BASIC METABOLIC PNL TOTAL CA: CPT

## 2024-05-06 PROCEDURE — C1887: CPT

## 2024-05-06 PROCEDURE — C1894: CPT

## 2024-05-06 PROCEDURE — 83036 HEMOGLOBIN GLYCOSYLATED A1C: CPT

## 2024-05-06 PROCEDURE — 85610 PROTHROMBIN TIME: CPT

## 2024-05-06 RX ORDER — ASPIRIN/CALCIUM CARB/MAGNESIUM 324 MG
1 TABLET ORAL
Qty: 30 | Refills: 0
Start: 2024-05-06 | End: 2024-06-04

## 2024-05-06 RX ORDER — CLOPIDOGREL BISULFATE 75 MG/1
1 TABLET, FILM COATED ORAL
Qty: 30 | Refills: 0
Start: 2024-05-06 | End: 2024-06-04

## 2024-05-06 RX ORDER — METFORMIN HYDROCHLORIDE 850 MG/1
1 TABLET ORAL
Refills: 0 | DISCHARGE

## 2024-05-06 RX ORDER — INSULIN GLARGINE 100 [IU]/ML
28 INJECTION, SOLUTION SUBCUTANEOUS AT BEDTIME
Refills: 0 | Status: DISCONTINUED | OUTPATIENT
Start: 2024-05-06 | End: 2024-05-06

## 2024-05-06 RX ORDER — INSULIN GLARGINE 100 [IU]/ML
24 INJECTION, SOLUTION SUBCUTANEOUS
Qty: 1 | Refills: 1
Start: 2024-05-06 | End: 2024-07-04

## 2024-05-06 RX ORDER — INSULIN LISPRO 100/ML
10 VIAL (ML) SUBCUTANEOUS
Qty: 1 | Refills: 1
Start: 2024-05-06 | End: 2024-07-04

## 2024-05-06 RX ADMIN — LOSARTAN POTASSIUM 100 MILLIGRAM(S): 100 TABLET, FILM COATED ORAL at 06:47

## 2024-05-06 RX ADMIN — Medication 10 UNIT(S): at 07:49

## 2024-05-06 RX ADMIN — SODIUM CHLORIDE 3 MILLILITER(S): 9 INJECTION INTRAMUSCULAR; INTRAVENOUS; SUBCUTANEOUS at 06:29

## 2024-05-06 RX ADMIN — Medication 6: at 11:34

## 2024-05-06 RX ADMIN — CLOPIDOGREL BISULFATE 75 MILLIGRAM(S): 75 TABLET, FILM COATED ORAL at 12:32

## 2024-05-06 RX ADMIN — HEPARIN SODIUM 5000 UNIT(S): 5000 INJECTION INTRAVENOUS; SUBCUTANEOUS at 06:48

## 2024-05-06 RX ADMIN — Medication 4: at 07:50

## 2024-05-06 RX ADMIN — Medication 10 UNIT(S): at 11:33

## 2024-05-06 RX ADMIN — PIPERACILLIN AND TAZOBACTAM 25 GRAM(S): 4; .5 INJECTION, POWDER, LYOPHILIZED, FOR SOLUTION INTRAVENOUS at 06:51

## 2024-05-06 RX ADMIN — Medication 81 MILLIGRAM(S): at 12:32

## 2024-05-06 NOTE — DISCHARGE NOTE PROVIDER - ATTENDING DISCHARGE PHYSICAL EXAMINATION:
GENERAL: alert, not in distress  CHEST/LUNG: b/l air entry  HEART: reg  ABDOMEN: soft, bs+  EXTREMITIES:  no edema, tenderness, dressing over left dorsum foot

## 2024-05-06 NOTE — PROGRESS NOTE ADULT - PROVIDER SPECIALTY LIST ADULT
Endocrinology
Vascular Surgery
Vascular Surgery
Anesthesia
Hospitalist
Hospitalist
Intervent Cardiology
Podiatry
Cardiology
Hospitalist
Hospitalist
Vascular Surgery
Hospitalist
Hospitalist

## 2024-05-06 NOTE — PROGRESS NOTE ADULT - ASSESSMENT
58M with PMHx HTN, T2DM presents to ED complaining of left foot swelling, redness, and large wound to top of foot. S/p balloon angioplasty of LLE. Consult for diabetes managemented, glucose on admission was 550 and a1c 14%.    1. Uncontrolled DM2  - Mild hyperglycemia, increase lantus to 28 units qhs  - Continue premeal admelog 10 units TID with meals  - Sliding scale coverage  - Discharge recommendations: Lantus 28 units daily, Admelog 10 units TID with meals, Metformin 1000mg BID  - Needs insulin teaching  - Will arrange outpatient follow up with our office  - Cpeptide 3.6 with glucose 282/ZHEN pending    2. L foot wound  - On abx, care per primary team    3. HLD  - Continue statin

## 2024-05-06 NOTE — PROGRESS NOTE ADULT - SUBJECTIVE AND OBJECTIVE BOX
INTERVAL EVENTS:  Follow up diabetes management   #[id number]  ROS: Denies pain, endorses good appetite.     MEDICATIONS  (STANDING):  amoxicillin  875 milliGRAM(s)/clavulanate 1 Tablet(s) Oral two times a day  aspirin  chewable 81 milliGRAM(s) Oral daily  atorvastatin 40 milliGRAM(s) Oral at bedtime  clopidogrel Tablet 75 milliGRAM(s) Oral daily  dextrose 10% Bolus 125 milliLiter(s) IV Bolus once  dextrose 5%. 1000 milliLiter(s) (50 mL/Hr) IV Continuous <Continuous>  dextrose 50% Injectable 25 Gram(s) IV Push once  glucagon  Injectable 1 milliGRAM(s) IntraMuscular once  heparin   Injectable 5000 Unit(s) SubCutaneous every 12 hours  influenza   Vaccine 0.5 milliLiter(s) IntraMuscular once  insulin glargine Injectable (LANTUS) 24 Unit(s) SubCutaneous at bedtime  insulin lispro (ADMELOG) corrective regimen sliding scale   SubCutaneous Before meals and at bedtime  insulin lispro Injectable (ADMELOG) 10 Unit(s) SubCutaneous before dinner  insulin lispro Injectable (ADMELOG) 10 Unit(s) SubCutaneous before breakfast  insulin lispro Injectable (ADMELOG) 10 Unit(s) SubCutaneous before lunch  losartan 100 milliGRAM(s) Oral daily  sodium chloride 0.9% Bolus 250 milliLiter(s) IV Bolus once  sodium chloride 0.9% lock flush 3 milliLiter(s) IV Push every 8 hours    MEDICATIONS  (PRN):  acetaminophen     Tablet .. 650 milliGRAM(s) Oral every 6 hours PRN Temp greater or equal to 38C (100.4F), Mild Pain (1 - 3)  aluminum hydroxide/magnesium hydroxide/simethicone Suspension 30 milliLiter(s) Oral every 4 hours PRN Dyspepsia  dextrose Oral Gel 15 Gram(s) Oral once PRN Blood Glucose LESS THAN 70 milliGRAM(s)/deciliter  melatonin 3 milliGRAM(s) Oral at bedtime PRN Insomnia  ondansetron Injectable 4 milliGRAM(s) IV Push every 8 hours PRN Nausea and/or Vomiting    Allergies  No Known Allergies    Vital Signs Last 24 Hrs  T(C): 36.6 (06 May 2024 10:56), Max: 37 (05 May 2024 15:11)  T(F): 97.8 (06 May 2024 10:56), Max: 98.6 (05 May 2024 15:11)  HR: 71 (06 May 2024 10:56) (71 - 86)  BP: 137/76 (06 May 2024 10:56) (121/70 - 188/88)  BP(mean): --  RR: 18 (06 May 2024 10:56) (18 - 18)  SpO2: 98% (06 May 2024 10:56) (96% - 99%)    Parameters below as of 06 May 2024 10:56  Patient On (Oxygen Delivery Method): room air    PHYSICAL EXAM:  General: No apparent distress  Respiratory: Lungs clear bilaterally  Cardiac: +S1, S2, no m/r/g  GI: +BS, soft, non tender, non distended  Extremities: Left foot in dressing  Neuro: A+O X3    LABS:    05-06    138  |  103  |  19.7  ----------------------------<  170<H>  4.3   |  24.0  |  0.72    Ca    8.5      06 May 2024 05:22  Mg     2.5     05-05    TPro  6.3<L>  /  Alb  3.0<L>  /  TBili  0.2<L>  /  DBili  x   /  AST  60<H>  /  ALT  46<H>  /  AlkPhos  60  05-06    Urinalysis Basic - ( 06 May 2024 05:22 )    Color: x / Appearance: x / SG: x / pH: x  Gluc: 170 mg/dL / Ketone: x  / Bili: x / Urobili: x   Blood: x / Protein: x / Nitrite: x   Leuk Esterase: x / RBC: x / WBC x   Sq Epi: x / Non Sq Epi: x / Bacteria: x    POCT Blood Glucose.: 253 mg/dL (05-06-24 @ 11:21)  POCT Blood Glucose.: 203 mg/dL (05-06-24 @ 07:42)  POCT Blood Glucose.: 253 mg/dL (05-05-24 @ 21:22)  POCT Blood Glucose.: 282 mg/dL (05-05-24 @ 17:01)

## 2024-05-06 NOTE — DISCHARGE NOTE PROVIDER - NSDCFUADDINST_GEN_ALL_CORE_FT
Patient to follow up at [facility] for advanced wound care [phone], or at the office [address] [place] [address] office, call [phone] to make an appointme Patient to follow up at [facility] for advanced wound care [phone], or at the office [address] [place] [address] office, call [phone] to make an appointment    WCO: Xeroform, gauze, abd pad, kerlex (tape close) to be changed 3x/week.

## 2024-05-06 NOTE — PROGRESS NOTE ADULT - ASSESSMENT
58 yr old male with hypertension, diabetes mellitus presented with complaints of 1 week of left foot swelling, redness with a wound on dorsum of left foot, skin sloughed off when he took his shoe and sock off. He was noted to large denuded area of skin with purulence with surrounding erythema, edema and warmth. Noted to have uncontrolled diabetes mellitus, A1C 14. Cultures were sent, Zosyn was initiated. Podiatry and vascular surgery was consulted, on exam, palpable femoral and popliteal pulses with non palpable pedal pulses. Plan for angiogram with possible intervention recommended by vascular surgery. Local wound care was recommended by podiatry. Blood cultures negative. TTE was done, normal LV function. Cardiology consulted, advised outpatient follow up. He underwent LLE angiogram with balloon angioplasty of L TP trunk on 5/3/24. Also given uncontrolled diabetes mellitus, he was given Lantus and pre meal insulin.     1. Left diabetic foot infection, underlying PAD:  transition to Augmentin  podiatry recommended local wound care  cultures negative so far.   S/p PCI, continue DAPT, Ator 40 mg QD  outpatient vascular surgery    2. Uncontrolled diabetes mellitus:  A1C 14  new to insulin administration  Continue Lantus 40 units QHS and increase pre meal to 10 units.  monitor FS  RN to teach to self administer insulin  endocrine eval appreciated    3. Hypertension:  Continue Losartan 100 mg QD    4. DVT ppx:  Heparin    Discussed with patient and RN.  Discharge home today.

## 2024-05-06 NOTE — DISCHARGE NOTE NURSING/CASE MANAGEMENT/SOCIAL WORK - NSDCPEFALRISK_GEN_ALL_CORE
For information on Fall & Injury Prevention, visit: [email or web address] OR  [email or web address] OR  https://www.cdc.gov/steadi/patient.html

## 2024-05-06 NOTE — DISCHARGE NOTE PROVIDER - NSDCMRMEDTOKEN_GEN_ALL_CORE_FT
Glucophage 1000 mg oral tablet: 1 tab(s) orally 2 times a day  losartan 100 mg oral tablet: 1 tab(s) orally once a day   Admelog SoloStar 100 units/mL injectable solution: 10 unit(s) injectable 3 times a day  alcohol pads: to check blood glucose 4 times a day  amoxicillin-clavulanate 875 mg-125 mg oral tablet: 1 tab(s) orally 2 times a day  aspirin 81 mg oral tablet, chewable: 1 tab(s) orally once a day  clopidogrel 75 mg oral tablet: 1 tab(s) orally once a day  glucometer: to check blood sugar 4 times a day  lancets: to check blood glucose 4 times a day  Lanlois Avitia Pen 100 units/mL subcutaneous solution: 24 unit(s) subcutaneous once a day (at bedtime)  losartan 100 mg oral tablet: 1 tab(s) orally once a day  strips for glucometer: to check blood glucose 4 times a day

## 2024-05-06 NOTE — DISCHARGE NOTE NURSING/CASE MANAGEMENT/SOCIAL WORK - PATIENT PORTAL LINK FT
You can access the FollowMyHealth Patient Portal offered by [facility] by registering at the following website: [email or web address]. By joining [employer]’s FollowMyHealth portal, you will also be able to view your health information using other applications (apps) compatible with our system.

## 2024-05-06 NOTE — DISCHARGE NOTE PROVIDER - PROVIDER TOKENS
PROVIDER:[TOKEN:[[id number]],FOLLOWUP:[1 week]],PROVIDER:[TOKEN:[[id number]],FOLLOWUP:[1 week]],PROVIDER:[TOKEN:[[id number]],FOLLOWUP:[1 week]],FREE:[LAST:[PCP],PHONE:[(   )    -],FAX:[(   )    -],FOLLOWUP:[1 week]],PROVIDER:[TOKEN:[[id number]],FOLLOWUP:[1 week]]

## 2024-05-06 NOTE — DISCHARGE NOTE PROVIDER - NSDCCPCAREPLAN_GEN_ALL_CORE_FT
PRINCIPAL DISCHARGE DIAGNOSIS  Diagnosis: Uncontrolled diabetes mellitus with hyperglycemia  Assessment and Plan of Treatment: - A1C 14  - Continue insulin as directed: lantus 24 units QHS & admelog 10 units TID  - Follow up with PCP and Endocrine in 1 week      SECONDARY DISCHARGE DIAGNOSES  Diagnosis: Wound of foot  Assessment and Plan of Treatment: - Complete Augmentin on discharge as directed  - Wound care: Xeroform, gauze, abd pad, kerlex (tape close) to be changed 3x/week.   - Follow up with PCP, vascular surgery and Podiatry in 1 week       Diagnosis: Hypertension  Assessment and Plan of Treatment: - Continue current regimen as directed   - Follow up with PCP in 1 week     PRINCIPAL DISCHARGE DIAGNOSIS  Diagnosis: Uncontrolled diabetes mellitus with hyperglycemia  Assessment and Plan of Treatment: - A1C 14  - Continue insulin as directed: lantus 24 units QHS & admelog 10 units TID  - Follow up with PCP and Endocrine in 1 week      SECONDARY DISCHARGE DIAGNOSES  Diagnosis: Wound of foot  Assessment and Plan of Treatment: - Complete Augmentin on discharge as directed  - Wound care: Xeroform, gauze, abd pad, kerlex (tape close) to be changed 3x/week.   - Follow up with PCP, vascular surgery and Podiatry in 1 week       Diagnosis: Hypertension  Assessment and Plan of Treatment: - Continue current regimen as directed   - Follow up with PCP in 1 week    Diagnosis: Peripheral artery disease  Assessment and Plan of Treatment: Continue DAPT and statin

## 2024-05-06 NOTE — DISCHARGE NOTE PROVIDER - CARE PROVIDERS DIRECT ADDRESSES
[email or web address]

## 2024-05-06 NOTE — PROGRESS NOTE ADULT - SUBJECTIVE AND OBJECTIVE BOX
INTERVAL HPI/OVERNIGHT EVENTS:    CC: PAD, uncontrolled diabetes mellitus    No overnight events  denies complaints.     Vital Signs Last 24 Hrs  T(C): 36.8 (06 May 2024 04:47), Max: 37 (05 May 2024 15:11)  T(F): 98.2 (06 May 2024 04:47), Max: 98.6 (05 May 2024 15:11)  HR: 86 (06 May 2024 04:47) (76 - 86)  BP: 154/75 (06 May 2024 04:47) (121/70 - 188/88)  BP(mean): --  RR: 18 (06 May 2024 04:47) (18 - 18)  SpO2: 96% (06 May 2024 04:47) (96% - 99%)    Parameters below as of 06 May 2024 04:47  Patient On (Oxygen Delivery Method): room air        PHYSICAL EXAM:    GENERAL: alert, not in distress  CHEST/LUNG: b/l air entry  HEART: reg  ABDOMEN: soft, bs+  EXTREMITIES:  no edema, tenderness, dressing over left dorsum foot    MEDICATIONS  (STANDING):  amoxicillin  875 milliGRAM(s)/clavulanate 1 Tablet(s) Oral two times a day  aspirin  chewable 81 milliGRAM(s) Oral daily  atorvastatin 40 milliGRAM(s) Oral at bedtime  clopidogrel Tablet 75 milliGRAM(s) Oral daily  dextrose 10% Bolus 125 milliLiter(s) IV Bolus once  dextrose 5%. 1000 milliLiter(s) (50 mL/Hr) IV Continuous <Continuous>  dextrose 50% Injectable 25 Gram(s) IV Push once  glucagon  Injectable 1 milliGRAM(s) IntraMuscular once  heparin   Injectable 5000 Unit(s) SubCutaneous every 12 hours  influenza   Vaccine 0.5 milliLiter(s) IntraMuscular once  insulin glargine Injectable (LANTUS) 24 Unit(s) SubCutaneous at bedtime  insulin lispro (ADMELOG) corrective regimen sliding scale   SubCutaneous Before meals and at bedtime  insulin lispro Injectable (ADMELOG) 10 Unit(s) SubCutaneous before dinner  insulin lispro Injectable (ADMELOG) 10 Unit(s) SubCutaneous before breakfast  insulin lispro Injectable (ADMELOG) 10 Unit(s) SubCutaneous before lunch  losartan 100 milliGRAM(s) Oral daily  sodium chloride 0.9% Bolus 250 milliLiter(s) IV Bolus once  sodium chloride 0.9% lock flush 3 milliLiter(s) IV Push every 8 hours    MEDICATIONS  (PRN):  acetaminophen     Tablet .. 650 milliGRAM(s) Oral every 6 hours PRN Temp greater or equal to 38C (100.4F), Mild Pain (1 - 3)  aluminum hydroxide/magnesium hydroxide/simethicone Suspension 30 milliLiter(s) Oral every 4 hours PRN Dyspepsia  dextrose Oral Gel 15 Gram(s) Oral once PRN Blood Glucose LESS THAN 70 milliGRAM(s)/deciliter  melatonin 3 milliGRAM(s) Oral at bedtime PRN Insomnia  ondansetron Injectable 4 milliGRAM(s) IV Push every 8 hours PRN Nausea and/or Vomiting      Allergies    No Known Allergies    Intolerances          LABS:      05-06    138  |  103  |  19.7  ----------------------------<  170<H>  4.3   |  24.0  |  0.72    Ca    8.5      06 May 2024 05:22  Mg     2.5     05-05    TPro  6.3<L>  /  Alb  3.0<L>  /  TBili  0.2<L>  /  DBili  x   /  AST  60<H>  /  ALT  46<H>  /  AlkPhos  60  05-06      Urinalysis Basic - ( 06 May 2024 05:22 )    Color: x / Appearance: x / SG: x / pH: x  Gluc: 170 mg/dL / Ketone: x  / Bili: x / Urobili: x   Blood: x / Protein: x / Nitrite: x   Leuk Esterase: x / RBC: x / WBC x   Sq Epi: x / Non Sq Epi: x / Bacteria: x        RADIOLOGY & ADDITIONAL TESTS:

## 2024-05-06 NOTE — DISCHARGE NOTE PROVIDER - CARE PROVIDER_API CALL
Muriel Cox  Vascular Surgery  [address] [place] [address]  Phone: [phone]  Fax: [phone]  Follow Up Time: 1 week    Sheila Valadez  Endocrinology/Metab/Diabetes  [address] [place] [address]  Phone: [phone]  Fax: [phone]  Follow Up Time: 1 week    Manpreet Vega  Podiatric Medicine  [address] [place] [address]  Phone: [phone]  Fax: [phone]  Follow Up Time: 1 week    PCP,   Phone: (   )    -  Fax: (   )    -  Follow Up Time: 1 week    Bob Cummings  Cardiology  [address] [place] [id number]  Phone: [phone]  Fax: [phone]  Follow Up Time: 1 week

## 2024-05-06 NOTE — PROGRESS NOTE ADULT - REASON FOR ADMISSION
Left foot Cellulitis

## 2024-05-07 PROBLEM — I10 ESSENTIAL (PRIMARY) HYPERTENSION: Chronic | Status: ACTIVE | Noted: 2024-05-01

## 2024-05-07 PROBLEM — Z00.00 ENCOUNTER FOR PREVENTIVE HEALTH EXAMINATION: Status: ACTIVE | Noted: 2024-05-07

## 2024-05-07 PROBLEM — E11.9 TYPE 2 DIABETES MELLITUS WITHOUT COMPLICATIONS: Chronic | Status: ACTIVE | Noted: 2024-05-01

## 2024-05-09 LAB — GAD65 AB SER-MCNC: 0.01 NMOL/L — SIGNIFICANT CHANGE UP

## 2024-06-11 ENCOUNTER — APPOINTMENT (OUTPATIENT)
Dept: ENDOCRINOLOGY | Facility: CLINIC | Age: 59
End: 2024-06-11
Payer: MEDICAID

## 2024-06-11 VITALS
WEIGHT: 177 LBS | SYSTOLIC BLOOD PRESSURE: 148 MMHG | OXYGEN SATURATION: 99 % | BODY MASS INDEX: 22.72 KG/M2 | HEIGHT: 74 IN | HEART RATE: 84 BPM | DIASTOLIC BLOOD PRESSURE: 84 MMHG

## 2024-06-11 VITALS — SYSTOLIC BLOOD PRESSURE: 112 MMHG | DIASTOLIC BLOOD PRESSURE: 68 MMHG

## 2024-06-11 DIAGNOSIS — E78.5 HYPERLIPIDEMIA, UNSPECIFIED: ICD-10-CM

## 2024-06-11 DIAGNOSIS — E11.65 TYPE 2 DIABETES MELLITUS WITH HYPERGLYCEMIA: ICD-10-CM

## 2024-06-11 DIAGNOSIS — L08.9 TYPE 2 DIABETES MELLITUS WITH OTHER SKIN COMPLICATIONS: ICD-10-CM

## 2024-06-11 DIAGNOSIS — Z78.9 OTHER SPECIFIED HEALTH STATUS: ICD-10-CM

## 2024-06-11 DIAGNOSIS — E11.628 TYPE 2 DIABETES MELLITUS WITH OTHER SKIN COMPLICATIONS: ICD-10-CM

## 2024-06-11 LAB — GLUCOSE BLDC GLUCOMTR-MCNC: 129

## 2024-06-11 PROCEDURE — 99215 OFFICE O/P EST HI 40 MIN: CPT

## 2024-06-11 PROCEDURE — 82962 GLUCOSE BLOOD TEST: CPT

## 2024-06-11 PROCEDURE — 95250 CONT GLUC MNTR PHYS/QHP EQP: CPT | Mod: 59

## 2024-06-11 RX ORDER — LOSARTAN POTASSIUM 100 MG/1
TABLET, FILM COATED ORAL
Refills: 0 | Status: ACTIVE | COMMUNITY

## 2024-06-11 RX ORDER — INSULIN GLARGINE 100 [IU]/ML
INJECTION, SOLUTION SUBCUTANEOUS
Refills: 0 | Status: ACTIVE | COMMUNITY

## 2024-06-11 RX ORDER — INSULIN LISPRO 100 U/ML
INJECTION, SOLUTION INTRAVENOUS; SUBCUTANEOUS
Refills: 0 | Status: ACTIVE | COMMUNITY

## 2024-06-11 NOTE — HISTORY OF PRESENT ILLNESS
[FreeTextEntry1] : Initial hx from [facility] 04/2024: 58M Citizen of [place] speaking, with hx of HTN, T2DM presents to ED c/o 1 week of left foot swelling, redness, and large wound to top of foot. s/p LLE angiogram with balloon angioplasty of L TP trunk. Endocrine consulted for uncontrolled DM.  : Ludmila [id number]  Interval hx: Feeling better after discharge. Has a nurse come to his house for left foot wound care 3x per week. Following with Dr- they cannot recall name within [place] [facility] in [place]  Here with Daughter Zahira   History of DM: Diagnosed: 10 years ago Severity: uncontrolled Family hx DM: mother Home regimen: Lantus 24 units QHS Admelog 10 units TID AC    Previous medications Metformin- stopped since discharge   FS in office: 129  SMBG: 3x per day, no log or meter to review this visit, per patient: Before breakfast: 120's-140's Before lunch: 130-140's Before dinner: 120's Hypoglycemia: denies Eye doctor: over due Neuropathy: denies, + foot wound- follows with [place] [facility] in [place] Kidney disease: denies   Smoking: denies Exercise: can't exercise 2/2 foot   Labs from hospital: A1c 14.1% c-peptide 3.6, glucose 282 ZHEN negative Cr 0.72,     All other ROS reviewed, and they are negative.

## 2024-06-11 NOTE — ASSESSMENT
[FreeTextEntry1] : T2DM - uncontrolled, goal A1C < 7 based on age - Labs from hospital--> A1c 14.1%, c-peptide 3.6, glucose 282 - Likely due to dietary indiscretion and nature of disease - Patient  culture, seems his diet consists of a lot of rice, plantains, etc- agreeable to meet with CDE - Discussed long term complications of diabetes at length including MI, CVA, ESRD, Dialysis, Blindness, Amputations, Infections, Erectile dysfunction - Check BG at least 4x per day, will help facilitate getting a CGM - Educated on diet and exercise - Discussed the goals of diabetes: A1C, Fasting FS and post meal 1 hour FS - Discussed the need to see Ophthalmology and Podiatry yearly - Has not been rotating sites well, reinforced the importance of rotating sites- pt verbalized understanding - No logs or meter to review but per patient with acceptable glucose control, will place Jr Pro Sensor (SN # [id number]   ) on patient and to return in 2-3 weeks with CDE to review and make and necessary changes/ CGM teach - For now, continue current regimen   HTN- BP stable - On Losartan 100 mg    I spent greater than 45 minutes discussing with patient face to face and non face to face reviewing documentations, labs, and/or imaging, also discussing the management plans.   RTO with CDE next available RTO in 6-8 weeks with me RTO in 3 months with MD

## 2024-06-11 NOTE — PHYSICAL EXAM
[Alert] : alert [No Acute Distress] : no acute distress [Thyroid Not Enlarged] : the thyroid was not enlarged [No Accessory Muscle Use] : no accessory muscle use [Normal Rate and Effort] : normal respiratory rate and effort [Clear to Auscultation] : lungs were clear to auscultation bilaterally [Normal Rate] : heart rate was normal [Regular Rhythm] : with a regular rhythm [Not Tender] : non-tender [Not Distended] : not distended [Soft] : abdomen soft [Oriented x3] : oriented to person, place, and time [Normal Affect] : the affect was normal [Normal Mood] : the mood was normal [de-identified] : left foot dressing c/d/i

## 2024-06-13 RX ORDER — BLOOD-GLUCOSE,RECEIVER,CONT
EACH MISCELLANEOUS
Qty: 6 | Refills: 1 | Status: ACTIVE | COMMUNITY
Start: 2024-06-12 | End: 1900-01-01

## 2024-06-13 RX ORDER — BLOOD-GLUCOSE SENSOR
EACH MISCELLANEOUS
Qty: 6 | Refills: 3 | Status: ACTIVE | COMMUNITY
Start: 2024-06-12 | End: 1900-01-01

## 2024-07-03 ENCOUNTER — EMERGENCY (EMERGENCY)
Facility: HOSPITAL | Age: 59
LOS: 1 days | Discharge: HOME | End: 2024-07-03
Attending: STUDENT IN AN ORGANIZED HEALTH CARE EDUCATION/TRAINING PROGRAM
Payer: MEDICAID

## 2024-07-03 VITALS
SYSTOLIC BLOOD PRESSURE: 211 MMHG | HEART RATE: 80 BPM | TEMPERATURE: 97 F | OXYGEN SATURATION: 100 % | RESPIRATION RATE: 18 BRPM | DIASTOLIC BLOOD PRESSURE: 96 MMHG

## 2024-07-03 PROCEDURE — 93010 ELECTROCARDIOGRAM REPORT: CPT

## 2024-07-03 PROCEDURE — 99284 EMERGENCY DEPT VISIT MOD MDM: CPT

## 2024-07-04 ENCOUNTER — TRANSCRIPTION ENCOUNTER (OUTPATIENT)
Age: 59
End: 2024-07-04

## 2024-07-04 VITALS
HEART RATE: 81 BPM | RESPIRATION RATE: 18 BRPM | OXYGEN SATURATION: 99 % | DIASTOLIC BLOOD PRESSURE: 87 MMHG | SYSTOLIC BLOOD PRESSURE: 196 MMHG | TEMPERATURE: 98 F

## 2024-07-04 PROCEDURE — 99284 EMERGENCY DEPT VISIT MOD MDM: CPT | Mod: 25

## 2024-07-04 PROCEDURE — 93005 ELECTROCARDIOGRAM TRACING: CPT

## 2024-07-04 PROCEDURE — T1013: CPT

## 2024-07-04 RX ORDER — HYDROXYZINE PAMOATE 50 MG/1
25 CAPSULE ORAL ONCE
Refills: 0 | Status: COMPLETED | OUTPATIENT
Start: 2024-07-04 | End: 2024-07-04

## 2024-07-04 RX ADMIN — HYDROXYZINE PAMOATE 25 MILLIGRAM(S): 50 CAPSULE ORAL at 00:12

## 2024-07-05 ENCOUNTER — APPOINTMENT (OUTPATIENT)
Dept: ENDOCRINOLOGY | Facility: CLINIC | Age: 59
End: 2024-07-05

## 2024-07-15 RX ORDER — INSULIN LISPRO 100 U/ML
100 INJECTION, SOLUTION INTRAVENOUS; SUBCUTANEOUS
Qty: 4 | Refills: 3 | Status: ACTIVE | COMMUNITY
Start: 2024-07-15 | End: 1900-01-01

## 2024-07-15 RX ORDER — INSULIN GLARGINE 100 [IU]/ML
100 INJECTION, SOLUTION SUBCUTANEOUS
Qty: 2 | Refills: 1 | Status: ACTIVE | COMMUNITY
Start: 2024-07-15 | End: 1900-01-01

## 2024-07-29 ENCOUNTER — APPOINTMENT (OUTPATIENT)
Dept: ENDOCRINOLOGY | Facility: CLINIC | Age: 59
End: 2024-07-29
Payer: MEDICAID

## 2024-07-29 VITALS — DIASTOLIC BLOOD PRESSURE: 100 MMHG | SYSTOLIC BLOOD PRESSURE: 158 MMHG

## 2024-07-29 VITALS — HEIGHT: 74 IN | BODY MASS INDEX: 22.72 KG/M2 | WEIGHT: 177 LBS

## 2024-07-29 DIAGNOSIS — E78.5 HYPERLIPIDEMIA, UNSPECIFIED: ICD-10-CM

## 2024-07-29 DIAGNOSIS — I10 ESSENTIAL (PRIMARY) HYPERTENSION: ICD-10-CM

## 2024-07-29 DIAGNOSIS — L08.9 TYPE 2 DIABETES MELLITUS WITH OTHER SKIN COMPLICATIONS: ICD-10-CM

## 2024-07-29 DIAGNOSIS — E11.628 TYPE 2 DIABETES MELLITUS WITH OTHER SKIN COMPLICATIONS: ICD-10-CM

## 2024-07-29 DIAGNOSIS — E11.65 TYPE 2 DIABETES MELLITUS WITH HYPERGLYCEMIA: ICD-10-CM

## 2024-07-29 LAB — GLUCOSE BLDC GLUCOMTR-MCNC: 230

## 2024-07-29 PROCEDURE — 99214 OFFICE O/P EST MOD 30 MIN: CPT

## 2024-07-29 PROCEDURE — 95251 CONT GLUC MNTR ANALYSIS I&R: CPT

## 2024-07-29 PROCEDURE — 82962 GLUCOSE BLOOD TEST: CPT

## 2024-07-29 RX ORDER — INSULIN LISPRO 100 U/ML
100 INJECTION, SOLUTION SUBCUTANEOUS
Qty: 2 | Refills: 0 | Status: ACTIVE | COMMUNITY
Start: 2024-07-29 | End: 1900-01-01

## 2024-07-29 RX ORDER — PEN NEEDLE, DIABETIC 29 G X1/2"
32G X 4 MM NEEDLE, DISPOSABLE MISCELLANEOUS
Qty: 4 | Refills: 1 | Status: ACTIVE | COMMUNITY
Start: 2024-07-29 | End: 1900-01-01

## 2024-07-29 RX ORDER — LOSARTAN POTASSIUM AND HYDROCHLOROTHIAZIDE 25; 100 MG/1; MG/1
100-25 TABLET ORAL DAILY
Qty: 1 | Refills: 0 | Status: ACTIVE | COMMUNITY
Start: 2024-07-29 | End: 1900-01-01

## 2024-07-29 NOTE — ASSESSMENT
[FreeTextEntry1] : T2DM - uncontrolled, goal A1C < 7 based on age - Likely due to dietary indiscretion and nature of disease - Patient  culture, seems his diet consists of a lot of rice, plantains, etc- agreeable to meet with CDE - Check BG at least 4x per day, keep log and bring to all appointments will help facilitate getting a CGM - Educated on diet and exercise - Discussed the goals of diabetes: A1C, Fasting FS and post meal 1 hour FS - Discussed the need to see Ophthalmology and Podiatry- referral given for optho - pt endorses rotating injection sites now - Reviewed Jr Pro download- overall hypergltycemia with significant post prandial hyperglycemia- however patient has not been taking Admelog consistently- he states if BG was < 100 he would not take. This am, FS was 87, ate and now 233 - Discussed hypoglycemia treatment - Increase Lantus to 28 units QHS - Continue Admelog 10 units TID AC- to take consistently - To f/u with CDE next available - To f/u with MD in 3 months   HTN- BP elevated, ran out of his Losartan-HCTZ 4 days ago, will send refill now - On Losartan-HCTZ 100/25 mg    Foot wound - Discussed importance of tight glycemic control to promote wound healing - Follows with podiatry  CGM STATEMENT: This patient with diabetes                                                                        - Injects insulin 4 times daily                           - Would benefit from use of CGM   CGM is medically necessary for this patient. - CGM will improve A1c - CGM will reduce hypoglycemic events   RTO with CDE next available RTO in 3 months with MD

## 2024-07-29 NOTE — PHYSICAL EXAM
[Alert] : alert [No Acute Distress] : no acute distress [No Accessory Muscle Use] : no accessory muscle use [Normal Rate and Effort] : normal respiratory rate and effort [Clear to Auscultation] : lungs were clear to auscultation bilaterally [Normal Rate] : heart rate was normal [Regular Rhythm] : with a regular rhythm [Not Tender] : non-tender [Not Distended] : not distended [Soft] : abdomen soft [Oriented x3] : oriented to person, place, and time [Normal Affect] : the affect was normal [Normal Mood] : the mood was normal [de-identified] : left foot dressing c/d/i

## 2024-07-29 NOTE — HISTORY OF PRESENT ILLNESS
[FreeTextEntry1] : : [id number]  Interval hx: Feeling well. Foot is better- still sees nurse 3x per week, seeing  every 3 weeks- see's next on Wednesday   History of DM: Diagnosed: 10 years ago Severity: uncontrolled Family hx DM: mother Home regimen: Lantus 24 units QHS Admelog 10 units TID AC --> when sugar is  does not take it    Previous medications Metformin- stopped since discharge   FS in office: 230--> ate coffee with sugar with 4 slices of bread, had this about 1.5 hours ago Reports this AM FS was 87-> reports typically FBG is 130-150  SMB-3x per day, brought Jr pro from previous visit Jr data reviewed Av Active CGM: 100% Very High: 30% High: 43% In Target: 26% Low: 1% Very Low: 0%  Eye doctor: over due Neuropathy: denies, + foot wound- [place] [facility] [place] Kidney disease: denies   Smoking: denies Exercise: can't exercise 2/2 foot   Labs from hospital: A1c 14.1% c-peptide 3.6, glucose 282 ZHEN negative Cr 0.72,     All other ROS reviewed, and they are negative.

## 2024-08-22 ENCOUNTER — NON-APPOINTMENT (OUTPATIENT)
Age: 59
End: 2024-08-22

## 2024-08-22 ENCOUNTER — APPOINTMENT (OUTPATIENT)
Dept: ENDOCRINOLOGY | Facility: CLINIC | Age: 59
End: 2024-08-22

## 2024-08-22 DIAGNOSIS — Z86.39 PERSONAL HISTORY OF OTHER ENDOCRINE, NUTRITIONAL AND METABOLIC DISEASE: ICD-10-CM

## 2024-08-22 DIAGNOSIS — Z78.9 OTHER SPECIFIED HEALTH STATUS: ICD-10-CM

## 2024-08-22 DIAGNOSIS — Z86.79 PERSONAL HISTORY OF OTHER DISEASES OF THE CIRCULATORY SYSTEM: ICD-10-CM

## 2024-08-22 DIAGNOSIS — M79.672 PAIN IN LEFT FOOT: ICD-10-CM

## 2024-08-22 DIAGNOSIS — E11.40 TYPE 2 DIABETES MELLITUS WITH DIABETIC NEUROPATHY, UNSPECIFIED: ICD-10-CM

## 2024-08-22 DIAGNOSIS — L97.522 NON-PRESSURE CHRONIC ULCER OF OTHER PART OF LEFT FOOT WITH FAT LAYER EXPOSED: ICD-10-CM

## 2024-08-30 ENCOUNTER — APPOINTMENT (OUTPATIENT)
Age: 59
End: 2024-08-30

## 2024-10-24 ENCOUNTER — APPOINTMENT (OUTPATIENT)
Dept: ENDOCRINOLOGY | Facility: CLINIC | Age: 59
End: 2024-10-24
